# Patient Record
Sex: MALE | Race: WHITE | NOT HISPANIC OR LATINO | Employment: OTHER | URBAN - METROPOLITAN AREA
[De-identification: names, ages, dates, MRNs, and addresses within clinical notes are randomized per-mention and may not be internally consistent; named-entity substitution may affect disease eponyms.]

---

## 2017-05-11 ENCOUNTER — THERAPY VISIT (OUTPATIENT)
Dept: PHYSICAL THERAPY | Facility: CLINIC | Age: 77
End: 2017-05-11
Payer: COMMERCIAL

## 2017-05-11 DIAGNOSIS — M54.41 ACUTE RIGHT-SIDED LOW BACK PAIN WITH RIGHT-SIDED SCIATICA: Primary | ICD-10-CM

## 2017-05-11 PROBLEM — M54.42 ACUTE LEFT-SIDED LOW BACK PAIN WITH LEFT-SIDED SCIATICA: Status: ACTIVE | Noted: 2017-05-11

## 2017-05-11 PROCEDURE — 97112 NEUROMUSCULAR REEDUCATION: CPT | Mod: GP | Performed by: PHYSICAL THERAPIST

## 2017-05-11 PROCEDURE — 97161 PT EVAL LOW COMPLEX 20 MIN: CPT | Mod: GP | Performed by: PHYSICAL THERAPIST

## 2017-05-11 PROCEDURE — 97110 THERAPEUTIC EXERCISES: CPT | Mod: GP | Performed by: PHYSICAL THERAPIST

## 2017-05-11 NOTE — MR AVS SNAPSHOT
"              After Visit Summary   2017    Sb Mohan    MRN: [id number]           Patient Information     Date Of Birth          1940        Visit Information        Provider Department      2017 9:10 AM Lalita Briscoe PT [facility] Physical Therapy        Today's Diagnoses     Acute right-sided low back pain with right-sided sciatica    -  1       Follow-ups after your visit        Who to contact     If you have questions or need follow up information about today's clinic visit or your schedule please contact [facility] PHYSICAL [facility] directly at [phone].  Normal or non-critical lab and imaging results will be communicated to you by ZAPhart, letter or phone within 4 business days after the clinic has received the results. If you do not hear from us within 7 days, please contact the clinic through ZAPhart or phone. If you have a critical or abnormal lab result, we will notify you by phone as soon as possible.  Submit refill requests through TOS[employer] ([employer]) or call your pharmacy and they will forward the refill request to us. Please allow 3 business days for your refill to be completed.          Additional Information About Your Visit        MyChart Information     [employer] ([employer]) lets you send messages to your doctor, view your test results, renew your prescriptions, schedule appointments and more. To sign up, go to [email or web address] ([employer]) . Click on \"Log in\" on the left side of the screen, which will take you to the Welcome page. Then click on \"Sign up Now\" on the right side of the page.     You will be asked to enter the access code listed below, as well as some personal information. Please follow the directions to create your username and password.     Your access code is: [email or web address]  Expires: 2017  4:25 PM     Your access code will  in 90 days. If you need help or a new code, please call your [place] clinic or " [phone].        Care EveryWhere ID     This is your Care EveryWhere ID. This could be used by other organizations to access your [place] medical records  [id number]         Blood Pressure from Last 3 Encounters:   01/11/16 128/86    Weight from Last 3 Encounters:   01/11/16 103.8 kg (228 lb 12.8 oz)              We Performed the Following     ALEXANDER Inital Eval Report     Neuromuscular Re-Education     PT Eval, Low Complexity ([id number])     Therapeutic Exercises        Primary Care Provider Office Phone # Fax #    Guru Galarza [phone]       [address] [place] [address] [address]        Thank you!     Thank you for choosing [place] [facility] PHYSICAL THERAPY  for your care. Our goal is always to provide you with excellent care. Hearing back from our patients is one way we can continue to improve our services. Please take a few minutes to complete the written survey that you may receive in the mail after your visit with us. Thank you!             Your Updated Medication List - Protect others around you: Learn how to safely use, store and throw away your medicines at www.disposemymeds.org.          This list is accurate as of: 5/11/17  4:25 PM.  Always use your most recent med list.                   Brand Name Dispense Instructions for use    aspirin 81 MG tablet      Take 1 tablet by mouth daily.       CIALIS PO      Take 10 mg by mouth as needed for erectile dysfunction       fish oil-omega-3 fatty acids 1000 MG capsule      Take 1,200 g by mouth daily       FLONASE 50 MCG/ACT spray   Generic drug:  fluticasone      Spray 2 sprays into both nostrils daily as needed       LIPITOR 40 MG tablet   Generic drug:  atorvastatin      Take 40 mg by mouth daily.       NEXIUM PO      Take 20 mg by mouth daily       omeprazole 20 MG CR capsule    priLOSEC     Take 20 mg by mouth daily.       oxyCODONE-acetaminophen 5-325 MG per tablet    PERCOCET     60 tablet    Take 1-2 tablets by mouth every 4 hours as needed for pain (moderate to severe)       senna-docusate 8.6-50 MG per tablet    SENOKOT-S;PERICOLACE    30 tablet    Take 1-2 tablets by mouth 2 times daily Take while on oral narcotics to prevent or treat constipation.       ZANTAC PO      Take 300 mg by mouth daily.

## 2017-05-11 NOTE — PROGRESS NOTES
[place] [facility] Initial Evaluation    Subjective:    Patient is a 76 year old male presenting with rehab back hpi. The history is provided by the patient. No  was used.   Sb Mohan is a 76 year old male with a lumbar condition.  Condition occurred with:  Insidious onset.  Condition occurred: for unknown reasons.  This is a new condition  Patient reports that he noted onset of right buttock and hamstring pain about 4-5 weeks ago. He noted this at about he time he started to change his golf swing. He has been to the chiropractor 3 times and the first session helps, but the last 2 sessions have not changed the pain. Md order from 5-9-17..      Radiates to:  Gluteals right, gluteals left and thigh right (ache into the right posterior mid/distal thigh, R>>l glut).  Pain is described as cramping and aching and is intermittent and reported as 3/10.   Pain is the same all the time (pain based more on actiivity).  Symptoms are exacerbated by sitting, walking, bending and certain positions (walk paul 60' and then after about 15-20' he will have up to 6-7/10 pain, BB when sits and increase pain) and relieved by other (stretching, walking).  Since onset symptoms are unchanged.        General health as reported by patient is good.  Pertinent medical history includes:  Heart problems and implanted device.  Medical allergies: no.  Other surgeries include:  Heart surgery and orthopedic surgery (R foot fusion).  Current medications:  Cardiac medication and other (acid reflux, antibiotic).  Current occupation is retired.            Red flags:  None as reported by the patient.                        Objective:    System         Lumbar/SI Evaluation  ROM:    AROM Lumbar:   Flexion:          Fingetips to ankles and HS tightness  Ext:                    Sign loss and increase L glut   Side Bend:        Left:     Right:   Rotation:           Left:     Right:   Side Glide:        Left:  Mod loss  and no change    Right:  Mod loss and no change          Lumbar Myotomes:  normal            Lumbar DTR's:  normal      Cord Signs:  normal    Lumbar Dermtomes:  normal                Neural Tension/Mobility:  Lumbar:  Normal        Lumbar Palpation:    Tenderness present at Left:    Erector Spinae  Tenderness present at Right: Erector Spinae                                                       Suresh Lumbar Evaluation    Posture:  Sitting: fair  Standing: fair  Lordosis: Reduced  Lateral Shift: no  Correction of Posture: better      Test Movements:  FIS: During: no effect  After: no effect    Repeat FIS: During: no effect  After: no effect    EIS: During: increases  After: no worse    Repeat EIS: During: increases  After: worse    ANAND: During: no effect  After: no effect    Repeat ANAND: During: no effect  After: no effect    EIL: During: decreases  After: no worse    Repeat EIL: During: decreases  After: better  Mechanical Response: IncROM                                                 ROS    Assessment/Plan:      Patient is a 76 year old male with lumbar complaints.    Patient has the following significant findings with corresponding treatment plan.                Diagnosis 1:  Lumbar derangement  Pain -  manual therapy, self management, education, directional preference exercise and home program  Decreased ROM/flexibility - manual therapy, therapeutic exercise, therapeutic activity and home program  Impaired muscle performance - neuro re-education and home program  Decreased function - therapeutic activities and home program  Impaired posture - neuro re-education and home program    Therapy Evaluation Codes:   1) History comprised of:   Personal factors that impact the plan of care:      None.    Comorbidity factors that impact the plan of care are:      None.     Medications impacting care: None.  2) Examination of Body Systems comprised of:   Body structures and functions that impact the plan of care:       Lumbar spine.   Activity limitations that impact the plan of care are:      Lifting and Sitting.  3) Clinical presentation characteristics are:   Stable/Uncomplicated.  4) Decision-Making    Low complexity using standardized patient assessment instrument and/or measureable assessment of functional outcome.  Cumulative Therapy Evaluation is: Low complexity.    Previous and current functional limitations:  (See Goal Flow Sheet for this information)    Short term and Long term goals: (See Goal Flow Sheet for this information)     Communication ability:  Patient appears to be able to clearly communicate and understand verbal and written communication and follow directions correctly.  Treatment Explanation - The following has been discussed with the patient:   RX ordered/plan of care  Anticipated outcomes  Possible risks and side effects  This patient would benefit from PT intervention to resume normal activities.   Rehab potential is good.    Frequency:  1 X week, once daily  Duration:  for 6 weeks  Discharge Plan:  Achieve all LTG.  Independent in home treatment program.  Reach maximal therapeutic benefit.    Please refer to the daily flowsheet for treatment today, total treatment time and time spent performing 1:1 timed codes.

## 2018-01-29 ENCOUNTER — THERAPY VISIT (OUTPATIENT)
Dept: PHYSICAL THERAPY | Facility: CLINIC | Age: 78
End: 2018-01-29
Payer: MEDICARE

## 2018-01-29 DIAGNOSIS — M25.551 HIP PAIN, RIGHT: Primary | ICD-10-CM

## 2018-01-29 PROCEDURE — G8981 BODY POS CURRENT STATUS: HCPCS | Mod: GP | Performed by: PHYSICAL THERAPIST

## 2018-01-29 PROCEDURE — 97162 PT EVAL MOD COMPLEX 30 MIN: CPT | Mod: GP | Performed by: PHYSICAL THERAPIST

## 2018-01-29 PROCEDURE — 97110 THERAPEUTIC EXERCISES: CPT | Mod: GP | Performed by: PHYSICAL THERAPIST

## 2018-01-29 PROCEDURE — G8982 BODY POS GOAL STATUS: HCPCS | Mod: GP | Performed by: PHYSICAL THERAPIST

## 2018-01-29 NOTE — PROGRESS NOTES
[facility] [facility] Initial Evaluation    Subjective:  Patient is a 77 year old male presenting with rehab right hip hpi. The history is provided by the patient. No  was used.   Sb Mohan is a 77 year old male with a right hip condition.  Condition occurred with:  Insidious onset.  Condition occurred: for unknown reasons.  This is a new condition  Patient reports that he had insidious onset of right hip pain about 3-4 weeks ago. He has a chronic tightness in the right hamstrings and ITB as well, which has gotten worse the last few months. MD order from 1-26-18..    Patient reports pain:  Greater trochanter and lateral (groin pain for about a week, but has none now ).  Radiates to:  Gluteals and other (right lower  back).  Pain is described as aching and is intermittent and reported as 6/10.  Associated symptoms:  Loss of motion/stiffness. Pain is worse during the night.  Symptoms are exacerbated by lying on extremity and ascending stairs (3/10 pain to ascend stairs normally, lay on right side for 3-4' with 5-8/10 pain, walk paul 1 mile with 2/10 pain) and relieved by ice.  Since onset symptoms are gradually improving.  Special tests:  X-ray (negative per patient ).      General health as reported by patient is good.  Pertinent medical history includes:  Heart problems and implanted device.  Medical allergies: no.  Other surgeries include:  Orthopedic surgery, heart surgery and other (gall bladder removed, R ankle fusion).  Current medications:  Cardiac medication and other (Zantac, prilosac).  Current occupation is retired.        Barriers include:  None as reported by the patient.    Red flags:  None as reported by the patient.                        Objective:  System         Lumbar/SI Evaluation  ROM:    AROM Lumbar:   Flexion:        Fingertips to ankles and B HS tightess  Ext:                    Mod to sign loss and tightness   Side Bend:        Left:     Right:    Rotation:           Left:     Right:   Side Glide:        Left:     Right:                                                               Hip Evaluation  HIP AROM:  AROM:    Left Hip:     Normal    Right Hip:   Normal (mod tightness R posterior hip capsule)                  Hip PROM:  Hip PROM:  Left Hip:    Normal  Right Hip:  Normal (no pain with PROM R hip)                          Hip Strength:    Flexion:   Left: 5/5   -  Pain:  Right: 5-/5   -  Pain:                    Extension:  Left: 4+/5  -  Pain:Right: 4/5    -  Pain:    Abduction:  Left: 4+/5    -   Pain:Right: 4/5   -   Pain:    Internal Rotation:  Left: 5/5   -   Pain:Right: 5/5   -  Pain:  External Rotation:  Left: 5-/5   -  Pain:  Right: 4+/5   -  Pain:  Knee Flexion:  Left: 5/5   -  Pain:Right: 5/5   -  Pain:  Knee Extension:  Left: 5/5   -  Pain:Right: 5/5    -  Pain:        Hip Special Testing:       Right hip positive for the following special tests:  FabreRight hip negative for the following special tests:  Piriformis; Fadir/Labrum; SLR; Alma's; Hayden or Femoral Nerve    Hip Palpation:      Right hip tenderness present at:  Greater Trachanter and IT Band  Right hip tenderness not present at:  hip flexors or Piriformis               Suresh Lumbar Evaluation      Movement Loss:  Flexion (Flex): min  Extension (EXT): mod  Side Glide R (SG R): mod  Side [place] L (SG L): mod                                               ROS    Assessment/Plan:    Patient is a 77 year old male with right side hip complaints.    Patient has the following significant findings with corresponding treatment plan.                Diagnosis 1:  Hip pain  Pain -  US, manual therapy, self management, education, directional preference exercise and home program  Decreased ROM/flexibility - manual therapy, therapeutic exercise and home program  Decreased joint mobility - manual therapy, therapeutic exercise and home program  Decreased strength - therapeutic exercise,  therapeutic activities and home program  Impaired balance - neuro re-education, therapeutic activities and home program  Impaired muscle performance - neuro re-education and home program  Decreased function - therapeutic activities and home program    Therapy Evaluation Codes:   1) History comprised of:   Personal factors that impact the plan of care:      Time since onset of symptoms.    Comorbidity factors that impact the plan of care are:      None.     Medications impacting care: None.  2) Examination of Body Systems comprised of:   Body structures and functions that impact the plan of care:      Hip.   Activity limitations that impact the plan of care are:      Stairs, Walking and Laying down.  3) Clinical presentation characteristics are:   Evolving/Changing.  4) Decision-Making    Moderate complexity using standardized patient assessment instrument and/or measureable assessment of functional outcome.  Cumulative Therapy Evaluation is: Moderate complexity.    Previous and current functional limitations:  (See Goal Flow Sheet for this information)    Short term and Long term goals: (See Goal Flow Sheet for this information)     Communication ability:  Patient appears to be able to clearly communicate and understand verbal and written communication and follow directions correctly.  Treatment Explanation - The following has been discussed with the patient:   RX ordered/plan of care  Anticipated outcomes  Possible risks and side effects  This patient would benefit from PT intervention to resume normal activities.   Rehab potential is good.    Frequency:  2 X week, once daily  Duration:  for 2 weeks tapering to 1 X a week over 4 weeks  Discharge Plan:  Achieve all LTG.  Independent in home treatment program.  Reach maximal therapeutic benefit.    Please refer to the daily flowsheet for treatment today, total treatment time and time spent performing 1:1 timed codes.

## 2018-01-29 NOTE — LETTER
DEPARTMENT OF HEALTH AND HUMAN SERVICES  CENTERS FOR MEDICARE & MEDICAID SERVICES    PLAN/UPDATED PLAN OF PROGRESS FOR OUTPATIENT REHABILITATION    PATIENTS NAME:  Sb Mohan     : 1940    PROVID[facility] NUMBER:    [id number]    [facility]    PROVIDER NAME: [place] [facility]    MEDICAL RECORD NUMBER: [id number]     START OF CARE DATE:  SOC Date: 18   TYPE:  PT    PRIMARY/TREATMENT DIAGNOSIS: (Pertinent Medical Diagnosis)  Hip pain, right    VISITS FROM START OF CARE:  Rxs Used: 1     [facility] Initial Evaluation    Subjective:  Patient is a 77 year old male presenting with rehab right hip hpi. The history is provided by the patient. No  was used.   Sb Mohan is a 77 year old male with a right hip condition.  Condition occurred with:  Insidious onset.  Condition occurred: for unknown reasons.  This is a new condition  Patient reports that he had insidious onset of right hip pain about 3-4 weeks ago. He has a chronic tightness in the right hamstrings and ITB as well, which has gotten worse the last few months. MD order from 18.  Patient reports pain:  Greater trochanter and lateral (groin pain for about a week, but has none now ).  Radiates to:  Gluteals and other (right lower  back).  Pain is described as aching and is intermittent and reported as 6/10.  Associated symptoms:  Loss of motion/stiffness. Pain is worse during the night.  Symptoms are exacerbated by lying on extremity and ascending stairs (3/10 pain to ascend stairs normally, lay on right side for 3-4' with 5-8/10 pain, walk paul 1 mile with 2/10 pain) and relieved by ice.  Since onset symptoms are gradually improving.  Special tests:  X-ray (negative per patient ).      General health as reported by patient is good.  Pertinent medical history includes:  Heart problems and implanted device.  Medical allergies: no.  Other surgeries  include:  Orthopedic surgery, heart surgery and other (gall bladder removed, R ankle fusion).  Current medications:  Cardiac medication and other (Zantac, prilosac).  Current occupation is retired.    Barriers include:  None as reported by the patient.  Red flags:  None as reported by the patient.    Objective:      Lumbar/SI Evaluation  ROM:    AROM Lumbar:   Flexion:        Fingertips to ankles and B HS tightess  Ext:                    Mod to sign loss and tightness   PATIENTS NAME:  Sb Mohan   : 1940- Page 2    Side Bend:        Left:     Right:   Rotation:           Left:     Right:   Side Glide:        Left:     Right:   Hip Evaluation  HIP AROM:  AROM:    Left Hip:     Normal    Right Hip:   Normal (mod tightness R posterior hip capsule)  Hip PROM:  Hip PROM:  Left Hip:    Normal  Right Hip:  Normal (no pain with PROM R hip)  Hip Strength:    Flexion:   Left: 5/5   -  Pain:  Right: 5-/5   -  Pain:  Extension:  Left: 4+/5  -  Pain:Right: 4/5    -  Pain:    Abduction:  Left: 4+/5    -   Pain:Right: 4/5   -   Pain:  Internal Rotation:  Left: 5/5   -   Pain:Right: 5/5   -  Pain:  External Rotation:  Left: 5-/5   -  Pain:  Right: 4+/5   -  Pain:  Knee Flexion:  Left: 5/5   -  Pain:Right: 5/5   -  Pain:  Knee Extension:  Left: 5/5   -  Pain:Right: 5/5    -  Pain:  Hip Special Testing:    Right hip positive for the following special tests:  FabreRight hip negative for the following special tests:  Piriformis; Fadir/Labrum; SLR; Alma's; Hayden or Femoral Nerve    Hip Palpation:    Right hip tenderness present at:  Greater Trachanter and IT Band  Right hip tenderness not present at:  hip flexors or Piriformis  Suresh Lumbar Evaluation  Movement Loss:  Flexion (Flex): min  Extension (EXT): mod  Side Glide R (SG R): mod  Side [place] L (SG L): mod    Assessment/Plan:    Patient is a 77 year old male with right side hip complaints.    Patient has the following significant findings with corresponding  treatment plan.                Diagnosis 1:  Hip pain  Pain -  US, manual therapy, self management, education, directional preference exercise and home program  Decreased ROM/flexibility - manual therapy, therapeutic exercise and home program  Decreased joint mobility - manual therapy, therapeutic exercise and home program  Decreased strength - therapeutic exercise, therapeutic activities and home program  Impaired balance - neuro re-education, therapeutic activities and home program  Impaired muscle performance - neuro re-education and home program  Decreased function - therapeutic activities and home program    Therapy Evaluation Codes:   1) History comprised of:   Personal factors that impact the plan of care:      Time since onset of symptoms.    Comorbidity factors that impact the plan of care are:      None.      PATIENTS NAME:  Sb Mohan   : 1940- Page 3     Medications impacting care: None.  2) Examination of Body Systems comprised of:   Body structures and functions that impact the plan of care:      Hip.   Activity limitations that impact the plan of care are:      Stairs, Walking and Laying down.  3) Clinical presentation characteristics are:   Evolving/Changing.  4) Decision-Making    Moderate complexity using standardized patient assessment instrument and/or measureable assessment of functional outcome.  Cumulative Therapy Evaluation is: Moderate complexity.    Previous and current functional limitations:  (See Goal Flow Sheet for this information)    Short term and Long term goals: (See Goal Flow Sheet for this information)     Communication ability:  Patient appears to be able to clearly communicate and understand verbal and written communication and follow directions correctly.  Treatment Explanation - The following has been discussed with the patient:   RX ordered/plan of care  Anticipated outcomes  Possible risks and side effects  This patient would benefit from PT intervention to  "resume normal activities.   Rehab potential is good.    Frequency:  2 X week, once daily  Duration:  for 2 weeks tapering to 1 X a week over 4 weeks  Discharge Plan:  Achieve all LTG.  Independent in home treatment program.  Reach maximal therapeutic benefit.    Caregiver Signature/Credentials _____________________________ Date ________       Treating Provider: Lalita Briscoe PT   I have reviewed and certified the need for these services and plan of treatment while under my care.        PHYSICIAN'S SIGNATURE:   _________________________________________  Date___________   Guru MD Geovanni    Certification period:  Beginning of Cert date period: 01/29/18 to  End of Cert period date: 04/25/18     Functional Level Progress Report: Please see attached \"Goal Flow sheet for Functional level.\"    ____X____ Continue Services or       ________ DC Services                Service dates: From  SOC Date: 01/29/18 date to present                         "

## 2018-01-29 NOTE — MR AVS SNAPSHOT
"              After Visit Summary   1/29/2018    Sb Mohan    MRN: [id number]           Patient Information     Date Of Birth          1940        Visit Information        Provider Department      1/29/2018 8:30 AM Lalita Briscoe, PT [facility]        Today's Diagnoses     Hip pain, right    -  1       Follow-ups after your visit        Your next 10 appointments already scheduled     Feb 05, 2018  6:30 AM CST   ALEXANDER Spine with Lalita Briscoe PT   [facility] ([facility])    [address]  [facility] [id number] [phone]            Feb 08, 2018  7:50 AM CST   ALEXANDER Spine with Lalita Briscoe PT   [facility] ([facility])    [address]  [facility] [address] [phone]              Who to contact     If you have questions or need follow up information about today's clinic visit or your schedule please contact [facility]ER[facility] directly at [phone].  Normal or non-critical lab and imaging results will be communicated to you by MyChart, letter or phone within 4 business days after the clinic has received the results. If you do not hear from us within 7 days, please contact the clinic through TravelMusehart or phone. If you have a critical or abnormal lab result, we will notify you by phone as soon as possible.  Submit refill requests through RunRev or call your pharmacy and they will forward the refill request to us. Please allow 3 business days for your refill to be completed.          Additional Information About Your Visit        MyChart Information     RunRev lets you send messages to your doctor, view your test results, renew your prescriptions, schedule appointments and more. To sign up, go to [email or web address] . Click on \"Log in\" on the left " "side of the screen, which will take you to the Welcome page. Then click on \"Sign up Now\" on the right side of the page.     You will be asked to enter the access code listed below, as well as some personal information. Please follow the directions to create your username and password.     Your access code is: [email or web address]  Expires: 2018  4:13 PM     Your access code will  in 90 days. If you need help or a new code, please call your [facility] or [phone].        Care EveryWhere ID     This is your Care EveryWhere ID. This could be used by other organizations to access your [place] medical records  [id number]         Blood Pressure from Last 3 Encounters:   16 128/86    Weight from Last 3 Encounters:   16 103.8 kg (228 lb 12.8 oz)              We Performed the Following     ALEXANDER CERT REPORT     ALEXANDER Inital Eval Report     PT Eval, Moderate Complexity (62804)     Therapeutic Exercises        Primary Care Provider Office Phone # Fax #    Guru Galarza [phone]       [address] SOLO [address] [place] [address]        Equal Access to Services     FRANSICO ETIENNE AH: Hadii aad ku hadasho Sodelorisali, waaxda luqadaha, qaybta kaalmada adeegyada, harshil martinez. So [facility] [phone].    ATENCIÓN: Si habla español, tiene a iyer disposición servicios gratuitos de asistencia lingüística. [facility] [phone].    We comply with applicable federal civil rights laws and [place] laws. We do not discriminate on the basis of race, color, national origin, age, disability, sex, sexual orientation, or gender identity.            Thank you!     Thank you for choosing [facility]  for your care. Our goal is always to provide you with excellent care. Hearing back from our patients is one way we can continue to improve our services. Please take a few minutes to complete the written survey that you " may receive in the mail after your visit with us. Thank you!             Your Updated Medication List - Protect others around you: Learn how to safely use, store and throw away your medicines at www.disposemymeds.org.          This list is accurate as of 1/29/18 11:59 PM.  Always use your most recent med list.                   Brand Name Dispense Instructions for use Diagnosis    aspirin 81 MG tablet      Take 1 tablet by mouth daily.    Keratosis, actinic       CIALIS PO      Take 10 mg by mouth as needed for erectile dysfunction        fish oil-omega-3 fatty acids 1000 MG capsule      Take 1,200 g by mouth daily    Keratosis, actinic       FLONASE 50 MCG/ACT spray   Generic drug:  fluticasone      Spray 2 sprays into both nostrils daily as needed        LIPITOR 40 MG tablet   Generic drug:  atorvastatin      Take 40 mg by mouth daily.    Keratosis, actinic       NEXIUM PO      Take 20 mg by mouth daily        omeprazole 20 MG CR capsule    priLOSEC     Take 20 mg by mouth daily.    Keratosis, actinic       oxyCODONE-acetaminophen 5-325 MG per tablet    PERCOCET    60 tablet    Take 1-2 tablets by mouth every 4 hours as needed for pain (moderate to severe)    S/P foot surgery, right       senna-docusate 8.6-50 MG per tablet    SENOKOT-S;PERICOLACE    30 tablet    Take 1-2 tablets by mouth 2 times daily Take while on oral narcotics to prevent or treat constipation.    S/P foot surgery, right       ZANTAC PO      Take 300 mg by mouth daily.    Keratosis, actinic

## 2018-02-01 ENCOUNTER — THERAPY VISIT (OUTPATIENT)
Dept: PHYSICAL THERAPY | Facility: CLINIC | Age: 78
End: 2018-02-01
Payer: MEDICARE

## 2018-02-01 DIAGNOSIS — M25.551 HIP PAIN, RIGHT: Primary | ICD-10-CM

## 2018-02-01 PROCEDURE — 97140 MANUAL THERAPY 1/> REGIONS: CPT | Mod: GP | Performed by: PHYSICAL THERAPIST

## 2018-02-01 PROCEDURE — 97110 THERAPEUTIC EXERCISES: CPT | Mod: GP | Performed by: PHYSICAL THERAPIST

## 2018-02-05 ENCOUNTER — THERAPY VISIT (OUTPATIENT)
Dept: PHYSICAL THERAPY | Facility: CLINIC | Age: 78
End: 2018-02-05
Payer: MEDICARE

## 2018-02-05 DIAGNOSIS — M25.551 HIP PAIN, RIGHT: ICD-10-CM

## 2018-02-05 PROCEDURE — 97110 THERAPEUTIC EXERCISES: CPT | Mod: GP | Performed by: PHYSICAL THERAPIST

## 2018-02-05 PROCEDURE — 97140 MANUAL THERAPY 1/> REGIONS: CPT | Mod: GP | Performed by: PHYSICAL THERAPIST

## 2018-02-05 NOTE — MR AVS SNAPSHOT
"              After Visit Summary   2/5/2018    Sb Mohan    MRN: [id number]           Patient Information     Date Of Birth          1940        Visit Information        Provider Department      2/5/2018 6:30 AM [facility]ita [facility]e PT [facility]        Today's Diagnoses     Hip pain, right           Follow-ups after your visit        Your next 10 appointments already scheduled     Feb 08, 2018  7:50 AM CST   ALEXANDER Spine with Lalita Briscoe PT   [facility] ([facility])    [address]  [facility] [id number] [phone]              Who to contact     If you have questions or need follow up information about today's clinic visit or your schedule please contact [facility] directly at [phone].  Normal or non-critical lab and imaging results will be communicated to you by MyChart, letter or phone within 4 business days after the clinic has received the results. If you do not hear from us within 7 days, please contact the clinic through Nanushkahart or phone. If you have a critical or abnormal lab result, we will notify you by phone as soon as possible.  Submit refill requests through [employer] [place] or call your pharmacy and they will forward the refill request to us. Please allow 3 business days for your refill to be completed.          Additional Information About Your Visit        MyChart Information     [employer] [place] lets you send messages to your doctor, view your test results, renew your prescriptions, schedule appointments and more. To sign up, go to [email or web address] . Click on \"Log in\" on the left side of the screen, which will take you to the Welcome page. Then click on \"Sign up Now\" on the right side of the page.     You will be asked to enter the access code listed below, as well as some personal information. Please follow " the directions to create your username and password.     Your access code is: [email or web address]  Expires: 2018  4:13 PM     Your access code will  in 90 days. If you need help or a new code, please call your [place] clinic or [phone].        Care EveryWhere ID     This is your Care EveryWhere ID. This could be used by other organizations to access your [place] medical records  [id number]         Blood Pressure from Last 3 Encounters:   16 128/86    Weight from Last 3 Encounters:   16 103.8 kg (228 lb 12.8 oz)              We Performed the Following     Manual Ther Tech, 1+Regions, EA 15 min     Therapeutic Exercises        Primary Care Provider Office Phone # Fax #    Guru Galarza [phone]       [address] SOLO [address] [place] [address]        Equal Access to Services     HALIMA ETIENNE : Hadii aad ku hadasho Soomaali, waaxda luqadaha, qaybta kaalmada adeegyada, waxay idiin hayaan howie dumont . So [facility] [phone].    ATENCIÓN: Si habla español, tiene a iyer disposición servicios gratuitos de asistencia lingüística. Og al [phone].    We comply with applicable federal civil rights laws and [place] laws. We do not discriminate on the basis of race, color, national origin, age, disability, sex, sexual orientation, or gender identity.            Thank you!     Thank you for choosing [facility]  for your care. Our goal is always to provide you with excellent care. Hearing back from our patients is one way we can continue to improve our services. Please take a few minutes to complete the written survey that you may receive in the mail after your visit with us. Thank you!             Your Updated Medication List - Protect others around you: Learn how to safely use, store and throw away your medicines at www.disposemymeds.org.          This list is accurate as of 18  7:37 AM.   Always use your most recent med list.                   Brand Name Dispense Instructions for use Diagnosis    aspirin 81 MG tablet      Take 1 tablet by mouth daily.    Keratosis, actinic       CIALIS PO      Take 10 mg by mouth as needed for erectile dysfunction        fish oil-omega-3 fatty acids 1000 MG capsule      Take 1,200 g by mouth daily    Keratosis, actinic       FLONASE 50 MCG/ACT spray   Generic drug:  fluticasone      Spray 2 sprays into both nostrils daily as needed        LIPITOR 40 MG tablet   Generic drug:  atorvastatin      Take 40 mg by mouth daily.    Keratosis, actinic       NEXIUM PO      Take 20 mg by mouth daily        omeprazole 20 MG CR capsule    priLOSEC     Take 20 mg by mouth daily.    Keratosis, actinic       oxyCODONE-acetaminophen 5-325 MG per tablet    PERCOCET    60 tablet    Take 1-2 tablets by mouth every 4 hours as needed for pain (moderate to severe)    S/P foot surgery, right       senna-docusate 8.6-50 MG per tablet    SENOKOT-S;PERICOLACE    30 tablet    Take 1-2 tablets by mouth 2 times daily Take while on oral narcotics to prevent or treat constipation.    S/P foot surgery, right       ZANTAC PO      Take 300 mg by mouth daily.    Keratosis, actinic

## 2018-02-05 NOTE — PROGRESS NOTES
Subjective:  HPI                    Objective:  System    Physical Exam    General     ROS    Assessment/Plan:    SUBJECTIVE  Subjective changes as noted by pt:  Patent reports that he slept well last night, but had some pain the night before. He does feel the half kneel stretch helps and he is doing it about 3 times a day.Laying on the right side is still what bothers him the most. He had no pain with bowling. He has no pain crossing his legs either way. He feels better if walks a coupe of miles every day on the treadmill.      Current pain level: 1/10     Changes in function:  Yes (See Goal flowsheet attached for changes in current functional level)     Adverse reaction to treatment or activity:  None    OBJECTIVE  Changes in objective findings:  Supine R hip PROM: no pain with movement. Less tenderness, but still moderate tightness R ITB.        ASSESSMENT  Sb continues to require intervention to meet STG and LTG's: PT  Patient's symptoms are resolving.  Patient is progressing as expected.  Response to therapy has shown an improvement in  pain level and function  Progress made towards STG/LTG?  Yes (See Goal flowsheet attached for updates on achievement of STG and LTG)    PLAN  Continue current treatment plan until patient demonstrates readiness to progress to higher level exercises.    PTA/ATC plan:  N/A    Please refer to the daily flowsheet for treatment today, total treatment time and time spent performing 1:1 timed codes.

## 2018-02-08 ENCOUNTER — THERAPY VISIT (OUTPATIENT)
Dept: PHYSICAL THERAPY | Facility: CLINIC | Age: 78
End: 2018-02-08
Payer: MEDICARE

## 2018-02-08 DIAGNOSIS — M25.551 HIP PAIN, RIGHT: ICD-10-CM

## 2018-02-08 PROCEDURE — 97110 THERAPEUTIC EXERCISES: CPT | Mod: GP | Performed by: PHYSICAL THERAPIST

## 2018-02-08 PROCEDURE — 97140 MANUAL THERAPY 1/> REGIONS: CPT | Mod: GP | Performed by: PHYSICAL THERAPIST

## 2018-02-26 ENCOUNTER — THERAPY VISIT (OUTPATIENT)
Dept: PHYSICAL THERAPY | Facility: CLINIC | Age: 78
End: 2018-02-26
Payer: MEDICARE

## 2018-02-26 DIAGNOSIS — M25.551 HIP PAIN, RIGHT: ICD-10-CM

## 2018-02-26 PROCEDURE — 97110 THERAPEUTIC EXERCISES: CPT | Mod: GP | Performed by: PHYSICAL THERAPIST

## 2018-02-26 PROCEDURE — 97530 THERAPEUTIC ACTIVITIES: CPT | Mod: GP | Performed by: PHYSICAL THERAPIST

## 2018-02-26 PROCEDURE — G8983 BODY POS D/C STATUS: HCPCS | Mod: GP | Performed by: PHYSICAL THERAPIST

## 2018-02-26 PROCEDURE — G8981 BODY POS CURRENT STATUS: HCPCS | Mod: GP | Performed by: PHYSICAL THERAPIST

## 2018-02-26 PROCEDURE — G8982 BODY POS GOAL STATUS: HCPCS | Mod: GP | Performed by: PHYSICAL THERAPIST

## 2018-02-26 NOTE — PROGRESS NOTES
Subjective:  HPI                    Objective:  System    Physical Exam    General     ROS    Assessment/Plan:    DISCHARGE  REPORT    Progress reporting period is from 1-29-18 to 2-26-18.       SUBJECTIVE  Subjective changes noted by patient:  Patient reports that he has had significant improvement in his hip pain since starting therapy. If he does get some pain, he can get rid of it by doing the hip flexor stretch. He has had some medial right knee pain, especially when trying to get up from the floor.         Current Pain level: 0/10.     Previous pain level was  6/10  .   Changes in function:  Yes (See Goal flowsheet attached for changes in current functional level)  Adverse reaction to treatment or activity: None    OBJECTIVE  Changes noted in objective findings:  No pain with passive or active R hip ROM. Gait WNL. R hip strength: extension and abduction+/5 with better endurance. He is not able to do a 6 inch step down without dropping his pelvis and going into knee valgus. Significant decrease in tenderness to R greater trochanteric bursa region and R ITB.        ASSESSMENT/PLAN  Updated problem list and treatment plan: Diagnosis 1:  Hip pain  Pain -  manual therapy, self management, education, directional preference exercise and home program  Decreased ROM/flexibility - manual therapy, therapeutic exercise, therapeutic activity and home program  Decreased strength - therapeutic exercise, therapeutic activities and home program  Impaired muscle performance - neuro re-education and home program  Decreased function - therapeutic activities and home program  STG/LTGs have been met or progress has been made towards goals:  Yes (See Goal flow sheet completed today.)  Assessment of Progress: The patient's condition is improving.  The patient's condition has potential to improve.  The patient has met all of their long term goals.  Self Management Plans:  Patient has been instructed in a home treatment  program.  Patient  has been instructed in self management of symptoms.    Sb continues to require the following intervention to meet STG and LTG's:  PT intervention is no longer required to meet STG/LTG.    Recommendations:  This patient is ready to be discharged from therapy and continue their home treatment program.    Please refer to the daily flowsheet for treatment today, total treatment time and time spent performing 1:1 timed codes.

## 2018-02-26 NOTE — LETTER
[facility]  [address] [facility] [address]  [place] [address] [phone]    2018    Re: Sb Mohan   :   1940  MRN:  [id number]   REFERRING PHYSICIAN:   Tanner Jerome  DISCHARGE  REPORT    Progress reporting period is from 18 to 18.       SUBJECTIVE  Subjective changes noted by patient:  Patient reports that he has had significant improvement in his hip pain since starting therapy. If he does get some pain, he can get rid of it by doing the hip flexor stretch. He has had some medial right knee pain, especially when trying to get up from the floor.         Current Pain level: 0/10.     Previous pain level was  6/10  .   Changes in function:  Yes (See Goal flowsheet attached for changes in current functional level)  Adverse reaction to treatment or activity: None    OBJECTIVE  Changes noted in objective findings:  No pain with passive or active R hip ROM. Gait WNL. R hip strength: extension and abduction+/5 with better endurance. He is not able to do a 6 inch step down without dropping his pelvis and going into knee valgus. Significant decrease in tenderness to R greater trochanteric bursa region and R ITB.        ASSESSMENT/PLAN  Updated problem list and treatment plan: Diagnosis 1:  Hip pain  Pain -  manual therapy, self management, education, directional preference exercise and home program  Decreased ROM/flexibility - manual therapy, therapeutic exercise, therapeutic activity and home program  Decreased strength - therapeutic exercise, therapeutic activities and home program  Impaired muscle performance - neuro re-education and home program  Decreased function - therapeutic activities and home program  STG/LTGs have been met or progress has been made towards goals:  Yes (See Goal flow sheet completed today.)  Assessment of Progress: The patient's condition is improving.  The patient's condition has potential to  improve.  The patient has met all of their long term goals.  Self Management Plans:  Patient has been instructed in a home treatment program.  Patient  has been instructed in self management of symptoms.    Sb continues to require the following intervention to meet STG and LTG's:  PT intervention is no longer required to meet STG/LTG.    Recommendations:  This patient is ready to be discharged from therapy and continue their home treatment program.    Thank you for your referral.    INQUIRIES  Therapist: Lalita Briscoe, PT   [facility]  [address] [place] [address]. [address]  [facility] [address]  Phone: [phone]  Fax: [phone]

## 2018-02-26 NOTE — MR AVS SNAPSHOT
"              After Visit Summary   2018    Sb Mohan    MRN: [id number]           Patient Information     Date Of Birth          1940        Visit Information        Provider Department      2018 8:30 AM Lalita [facility]e PT [facility]        Today's Diagnoses     Hip pain, right           Follow-ups after your visit        Who to contact     If you have questions or need follow up information about today's clinic visit or your schedule please contact [facility] directly at [phone].  Normal or non-critical lab and imaging results will be communicated to you by Bluechillihart, letter or phone within 4 business days after the clinic has received the results. If you do not hear from us within 7 days, please contact the clinic through ProjectSpeakert or phone. If you have a critical or abnormal lab result, we will notify you by phone as soon as possible.  Submit refill requests through [place] [employer] or call your pharmacy and they will forward the refill request to us. Please allow 3 business days for your refill to be completed.          Additional Information About Your Visit        MyChart Information     [employer] lets you send messages to your doctor, view your test results, renew your prescriptions, schedule appointments and more. To sign up, go to [email or web address] . Click on \"Log in\" on the left side of the screen, which will take you to the Welcome page. Then click on \"Sign up Now\" on the right side of the page.     You will be asked to enter the access code listed below, as well as some personal information. Please follow the directions to create your username and password.     Your access code is: [email or web address]  Expires: 2018  4:13 PM     Your access code will  in 90 days. If you need help or a new code, please call your [place] clinic or [phone].        Care EveryWhere ID     This " is your Care EveryWhere ID. This could be used by other organizations to access your [place] medical records  [id number]         Blood Pressure from Last 3 Encounters:   01/11/16 128/86    Weight from Last 3 Encounters:   01/11/16 103.8 kg (228 lb 12.8 oz)              We Performed the Following     ALEXANDER Progress Notes Report     Therapeutic Activities     Therapeutic Exercises        Primary Care Provider Office Phone # Fax #    Guru Galarza [phone]       [address] [facility] [address]        Equal Access to Services     : Hadii aad ku hadasho Soomaali, waaxda luqadaha, qaybta kaalmada adeegyada, waxay cristhianin hayaan ademay dumont . So [facility] [phone].    ATENCIÓN: Si habla español, tiene a iyer disposición servicios gratuitos de asistencia lingüística. [facility] [phone].    We comply with applicable federal civil rights laws and [place] laws. We do not discriminate on the basis of race, color, national origin, age, disability, sex, sexual orientation, or gender identity.            Thank you!     Thank you for choosing [facility] PHYSICAL THERAP[facility]  for your care. Our goal is always to provide you with excellent care. Hearing back from our patients is one way we can continue to improve our services. Please take a few minutes to complete the written survey that you may receive in the mail after your visit with us. Thank you!             Your Updated Medication List - Protect others around you: Learn how to safely use, store and throw away your medicines at www.disposemymeds.org.          This list is accurate as of 2/26/18  2:14 PM.  Always use your most recent med list.                   Brand Name Dispense Instructions for use Diagnosis    aspirin 81 MG tablet      Take 1 tablet by mouth daily.    Keratosis, actinic       CIALIS PO      Take 10 mg by mouth as needed for erectile dysfunction         fish oil-omega-3 fatty acids 1000 MG capsule      Take 1,200 g by mouth daily    Keratosis, actinic       FLONASE 50 MCG/ACT spray   Generic drug:  fluticasone      Spray 2 sprays into both nostrils daily as needed        LIPITOR 40 MG tablet   Generic drug:  atorvastatin      Take 40 mg by mouth daily.    Keratosis, actinic       NEXIUM PO      Take 20 mg by mouth daily        omeprazole 20 MG CR capsule    priLOSEC     Take 20 mg by mouth daily.    Keratosis, actinic       oxyCODONE-acetaminophen 5-325 MG per tablet    PERCOCET    60 tablet    Take 1-2 tablets by mouth every 4 hours as needed for pain (moderate to severe)    S/P foot surgery, right       senna-docusate 8.6-50 MG per tablet    SENOKOT-S;PERICOLACE    30 tablet    Take 1-2 tablets by mouth 2 times daily Take while on oral narcotics to prevent or treat constipation.    S/P foot surgery, right       ZANTAC PO      Take 300 mg by mouth daily.    Keratosis, actinic

## 2019-01-15 ENCOUNTER — THERAPY VISIT (OUTPATIENT)
Dept: PHYSICAL THERAPY | Facility: CLINIC | Age: 79
End: 2019-01-15
Payer: MEDICARE

## 2019-01-15 DIAGNOSIS — M54.42 ACUTE BILATERAL LOW BACK PAIN WITH LEFT-SIDED SCIATICA: ICD-10-CM

## 2019-01-15 PROCEDURE — 97161 PT EVAL LOW COMPLEX 20 MIN: CPT | Mod: GP | Performed by: PHYSICAL THERAPIST

## 2019-01-15 PROCEDURE — 97112 NEUROMUSCULAR REEDUCATION: CPT | Mod: GP | Performed by: PHYSICAL THERAPIST

## 2019-01-15 PROCEDURE — 97110 THERAPEUTIC EXERCISES: CPT | Mod: GP | Performed by: PHYSICAL THERAPIST

## 2019-01-15 NOTE — PROGRESS NOTES
[facility] Initial Evaluation  Subjective:  The history is provided by the patient. No  was used.   Sb Mohan is a 78 year old male with a lumbar condition.  Condition occurred with:  A fall/slip.  Condition occurred: at home.  This is a new condition  Patient reports that about 2 weeks ago he slipped on the ice and caught himself and didn't fall, but felt something in his lower back. The pain became worse the following day and has persisted. MD order from 1-11-19. .    Patient reports pain:  Lumbar spine left, lumbar spine right and central lumbar spine (L>>>R).  Radiates to:  Foot left, lower leg left and gluteals left (lateral L lower leg and into the L foot).  Pain is described as aching and is intermittent and reported as 4/10.  Associated symptoms:  Loss of motion/stiffness. Pain is the same all the time.  Symptoms are exacerbated by sitting, lifting, bending and other (sit paul 1 hour with 4-5/10, difficult to cross L leg over the R in sitting ) and relieved by other, ice and NSAID's (doing some exercises).  Since onset symptoms are gradually improving.    Previous treatment includes chiropractic (has had 3 chiropractor visits wtih 10-15% improvement).    General health as reported by patient is good.  Pertinent medical history includes:  Heart problems and implanted device.  Medical allergies: no.  Other surgeries include:  Heart surgery and orthopedic surgery (ANKLE/TOE SURGERY).  Current medications:  Cardiac medication, anti-inflammatory and other (antacids).  Current occupation is Retired teacher.        Barriers include:  None as reported by the patient.    Red flags:  None as reported by the patient.                        Objective:  System         Lumbar/SI Evaluation  ROM:    AROM Lumbar:   Flexion:          Fingertips to just above ankles and tight HS  Ext:                    Mod to sign loss and no change   Side Bend:        Left:     Right:    Rotation:           Left:     Right:   Side Glide:        Left:  Mod loss and ache L lower back    Right:  Min loss and no pain          Lumbar Myotomes:  normal            Lumbar DTR's:  normal      Cord Signs:  normal    Lumbar Dermtomes:  normal                Neural Tension/Mobility:  Lumbar:  Not assessed        Lumbar Palpation:    Tenderness present at Left:    Quadratus Lumborum and Erector Spinae  Tenderness present at Right: Erector Spinae                                                       Suresh Lumbar Evaluation    Posture:  Sitting: fair  Standing: fair  Lordosis: Reduced  Lateral Shift: no  Correction of Posture: better      Test Movements:  FIS: During: increases  After: no worse    Repeat FIS: During: increases  After: worse and peripheralizing    EIS: During: increases  After: no worse    Repeat EIS: During: decreases  After: better and centralizing  Mechanical Response: IncROM                                                     ROS    Assessment/Plan:    Patient is a 78 year old male with lumbar complaints.    Patient has the following significant findings with corresponding treatment plan.                Diagnosis 1:  Lumbar derangement  Pain -  US, manual therapy, self management, education, directional preference exercise and home program  Decreased ROM/flexibility - manual therapy, therapeutic exercise and home program  Impaired muscle performance - neuro re-education and home program  Decreased function - therapeutic activities and home program  Impaired posture - neuro re-education and home program    Therapy Evaluation Codes:   1) History comprised of:   Personal factors that impact the plan of care:      None.    Comorbidity factors that impact the plan of care are:      None.     Medications impacting care: None.  2) Examination of Body Systems comprised of:   Body structures and functions that impact the plan of care:      Lumbar spine.   Activity limitations that impact the plan  of care are:      Bathing, Bending and Sitting.  3) Clinical presentation characteristics are:   Stable/Uncomplicated.  4) Decision-Making    Low complexity using standardized patient assessment instrument and/or measureable assessment of functional outcome.  Cumulative Therapy Evaluation is: Low complexity.    Previous and current functional limitations:  (See Goal Flow Sheet for this information)    Short term and Long term goals: (See Goal Flow Sheet for this information)     Communication ability:  Patient appears to be able to clearly communicate and understand verbal and written communication and follow directions correctly.  Treatment Explanation - The following has been discussed with the patient:   RX ordered/plan of care  Anticipated outcomes  Possible risks and side effects  This patient would benefit from PT intervention to resume normal activities.   Rehab potential is good.    Frequency:  2 X week, once daily  Duration:  for 2 weeks tapering to 1 X a week over 2 weeks  Discharge Plan:  Achieve all LTG.  Independent in home treatment program.  Reach maximal therapeutic benefit.    Please refer to the daily flowsheet for treatment today, total treatment time and time spent performing 1:1 timed codes.

## 2019-01-15 NOTE — LETTER
DEPARTMENT OF HEALTH AND HUMAN SERVICES  CENTERS FOR MEDICARE & MEDICAID SERVICES    PLAN/UPDATED PLAN OF PROGRESS FOR OUTPATIENT REHABILITATION    PATIENTS NAME:  Sb Mohan   : 1940  PROVID[facility] NUMBER:    [id number]  [id number]   PROVIDER NAME: [facility]  MEDICAL RECORD NUMBER: [id number]   START OF CARE DATE:  SOC Date: 01/15/19   TYPE:  PT  PRIMARY/TREATMENT DIAGNOSIS: (Pertinent Medical Diagnosis)  Acute bilateral low back pain with left-sided sciatica  VISITS FROM START OF CARE:  Rxs Used: 1     [facility] Initial Evaluation  Subjective:  The history is provided by the patient. No  was used.   Sb Mohan is a 78 year old male with a lumbar condition.  Condition occurred with:  A fall/slip.  Condition occurred: at home.  This is a new condition  Patient reports that about 2 weeks ago he slipped on the ice and caught himself and didn't fall, but felt something in his lower back. The pain became worse the following day and has persisted. MD order from 19. .    Patient reports pain:  Lumbar spine left, lumbar spine right and central lumbar spine (L>>>R).  Radiates to:  Foot left, lower leg left and gluteals left (lateral L lower leg and into the L foot).  Pain is described as aching and is intermittent and reported as 4/10.  Associated symptoms:  Loss of motion/stiffness. Pain is the same all the time.  Symptoms are exacerbated by sitting, lifting, bending and other (sit paul 1 hour with 4-5/10, difficult to cross L leg over the R in sitting ) and relieved by other, ice and NSAID's (doing some exercises).  Since onset symptoms are gradually improving.    Previous treatment includes chiropractic (has had 3 chiropractor visits wtih 10-15% improvement).    General health as reported by patient is good.  Pertinent medical history includes:  Heart problems and implanted device.  Medical allergies: no.  Other  surgeries include:  Heart surgery and orthopedic surgery (ANKLE/TOE SURGERY).  Current medications:  Cardiac medication, anti-inflammatory and other (antacids).  Current occupation is Retired teacher.      Barriers include:  None as reported by the patient.  Red flags:  None as reported by the patient.  Objective:  System    Lumbar/SI Evaluation  ROM:    AROM Lumbar:   Flexion:          Fingertips to just above ankles and tight HS  Ext:                    Mod to sign loss and no change   Side Bend:        Left:     Right:   Rotation:           Left:     Right:   Side Glide:        Left:  Mod loss and ache L lower back    Right:  Min loss and no pain       Lumbar Myotomes:  normal  Lumbar DTR's:  normal  Cord Signs:  normal  Lumbar Dermtomes:  normal  Neural Tension/Mobility:  Lumbar:  Not assessed    Lumbar Palpation:    Tenderness present at Left:    Quadratus Lumborum and Erector Spinae  Tenderness present at Right: Erector Spinae    Suresh Lumbar Evaluation  Posture:  Sitting: fair  Standing: fair  Lordosis: Reduced  Lateral Shift: no  Correction of Posture: better  Test Movements:  FIS: During: increases  After: no worse    Repeat FIS: During: increases  After: worse and peripheralizing    EIS: During: increases  After: no worse    Repeat EIS: During: decreases  After: better and centralizing  Mechanical Response: IncROM  Assessment/Plan:    Patient is a 78 year old male with lumbar complaints.    Patient has the following significant findings with corresponding treatment plan.                Diagnosis 1:  Lumbar derangement  Pain -  US, manual therapy, self management, education, directional preference exercise and home program  Decreased ROM/flexibility - manual therapy, therapeutic exercise and home program  Impaired muscle performance - neuro re-education and home program  Decreased function - therapeutic activities and home program  Impaired posture - neuro re-education and home program    Therapy  Evaluation Codes:   1) History comprised of:   Personal factors that impact the plan of care:      None.    Comorbidity factors that impact the plan of care are:      None.     Medications impacting care: None.  2) Examination of Body Systems comprised of:   Body structures and functions that impact the plan of care:      Lumbar spine.   Activity limitations that impact the plan of care are:      Bathing, Bending and Sitting.  3) Clinical presentation characteristics are:   Stable/Uncomplicated.  4) Decision-Making    Low complexity using standardized patient assessment instrument and/or measureable assessment of functional outcome.  Cumulative Therapy Evaluation is: Low complexity.    Previous and current functional limitations:  (See Goal Flow Sheet for this information)    Short term and Long term goals: (See Goal Flow Sheet for this information)     Communication ability:  Patient appears to be able to clearly communicate and understand verbal and written communication and follow directions correctly.  Treatment Explanation - The following has been discussed with the patient:   RX ordered/plan of care  Anticipated outcomes  Possible risks and side effects  This patient would benefit from PT intervention to resume normal activities.   Rehab potential is good.    Frequency:  2 X week, once daily  Duration:  for 2 weeks tapering to 1 X a week over 2 weeks  Discharge Plan:  Achieve all LTG.  Independent in home treatment program.  Reach maximal therapeutic benefit.    Please refer to the daily flowsheet for treatment today, total treatment time and time spent performing 1:1 timed codes.     Caregiver Signature/Credentials ______________________________________________ Date ________       Lalita Briscoe PT   I have reviewed and certified the need for these services and plan of treatment while under my care.        PHYSICIAN'S SIGNATURE:   ______________________________________________  Date___________     Jesús  "Vishal Ivey MD    Certification period:  Beginning of Cert date period: 01/15/19 to  End of Cert period date: 03/26/19   Functional Level Progress Report: Please see attached \"Goal Flow sheet for Functional level.\"  ____X____ Continue Services or       ________ DC Services              Service dates: From  SOC Date: 01/15/19 date to present                       "

## 2019-01-17 ENCOUNTER — THERAPY VISIT (OUTPATIENT)
Dept: PHYSICAL THERAPY | Facility: CLINIC | Age: 79
End: 2019-01-17
Payer: MEDICARE

## 2019-01-17 DIAGNOSIS — M54.42 ACUTE BILATERAL LOW BACK PAIN WITH LEFT-SIDED SCIATICA: ICD-10-CM

## 2019-01-17 PROCEDURE — 97110 THERAPEUTIC EXERCISES: CPT | Mod: GP | Performed by: PHYSICAL THERAPIST

## 2019-01-17 PROCEDURE — 97140 MANUAL THERAPY 1/> REGIONS: CPT | Mod: GP | Performed by: PHYSICAL THERAPIST

## 2019-01-23 ENCOUNTER — THERAPY VISIT (OUTPATIENT)
Dept: PHYSICAL THERAPY | Facility: CLINIC | Age: 79
End: 2019-01-23
Payer: MEDICARE

## 2019-01-23 DIAGNOSIS — M54.42 ACUTE BILATERAL LOW BACK PAIN WITH LEFT-SIDED SCIATICA: ICD-10-CM

## 2019-01-23 PROCEDURE — 97110 THERAPEUTIC EXERCISES: CPT | Mod: GP | Performed by: PHYSICAL THERAPIST

## 2019-01-23 PROCEDURE — 97140 MANUAL THERAPY 1/> REGIONS: CPT | Mod: GP | Performed by: PHYSICAL THERAPIST

## 2019-01-23 NOTE — PROGRESS NOTES
Subjective:  HPI                    Objective:  System    Physical Exam    General     ROS    Assessment/Plan:    SUBJECTIVE  Subjective changes as noted by pt:  Patient reports that he has improved by 70% since starting therapy. He bowled 3 lines on Monday of this week and had no pain with the bowling, but had some soreness following.His pain is in the hip/buttock region-no leg pain.             Current Pain level: 0/10   Changes in function:  Yes (See Goal flowsheet attached for changes in current functional level)     Adverse reaction to treatment or activity:  None    OBJECTIVE  Changes in objective findings:  Standing LROM: FB with fingertips to ankles and no pain and no pain with RFIS, BB mod loss and no pain, and B side glides min loss and no pain. Negative dural signs and symptoms.               ASSESSMENT  Sb continues to require intervention to meet STG and LTG's: PT  Patient's symptoms are resolving.  Patient is progressing as expected.  Response to therapy has shown an improvement in  radicular symptoms, ROM  and function  Progress made towards STG/LTG?  Yes (See Goal flowsheet attached for updates on achievement of STG and LTG)    PLAN  Current treatment program is being advanced to more complex exercises.    PTA/ATC plan:  N/A    Please refer to the daily flowsheet for treatment today, total treatment time and time spent performing 1:1 timed codes.

## 2019-01-23 NOTE — PROGRESS NOTES
"Subjective:  HPI                    Objective:  System    Physical Exam    General     ROS    Assessment/Plan:      Progress reporting period is from *** to   SUBJECTIVE  Subjective changes noted by patient:  .     Previous pain level was  {PAIN LEVEL (SCALE OF 10):940024}  .   Changes in function:    Adverse reaction to treatment or activity:     OBJECTIVE  Changes noted in objective findings:         ASSESSMENT/PLAN  Updated problem list and treatment plan: {DIAGNOSIS/PLAN REHAB:174052}  STG/LTGs have been met or progress has been made towards goals:  {Goals met/progress made:706199::\"Yes (See Goal flow sheet completed today.)\"}  Assessment of Progress: {Assessment of progress:696758}  Self Management Plans:  {Self Management Plans:973820}  {Appropriateness of Rx:470135}  Sb continues to require the following intervention to meet STG and LTG's:  {INTERVENTION REHAB:721637}    Recommendations:  {RECOMMENDATIONS REHAB:493748}    Please refer to the daily flowsheet for treatment today, total treatment time and time spent performing 1:1 timed codes.          "

## 2019-01-29 ENCOUNTER — THERAPY VISIT (OUTPATIENT)
Dept: PHYSICAL THERAPY | Facility: CLINIC | Age: 79
End: 2019-01-29
Payer: MEDICARE

## 2019-01-29 DIAGNOSIS — M54.42 ACUTE BILATERAL LOW BACK PAIN WITH LEFT-SIDED SCIATICA: ICD-10-CM

## 2019-01-29 PROCEDURE — 97110 THERAPEUTIC EXERCISES: CPT | Mod: GP | Performed by: PHYSICAL THERAPIST

## 2019-01-29 PROCEDURE — 97140 MANUAL THERAPY 1/> REGIONS: CPT | Mod: GP | Performed by: PHYSICAL THERAPIST

## 2019-01-29 NOTE — LETTER
[facility]  [address]  [facility] [address]  [phone]    2019    Re: Sb Mohan   :   1940  MRN:  [id number]   REFERRING PHYSICIAN:   Jesús Ivey    [facility]  Date of Initial Evaluation:  1/15/19  Visits:  Rxs Used: 4  Reason for Referral:  Acute bilateral low back pain with left-sided sciatica    Oswestry Score: 2 %                 DISCHARGE REPORT  Progress reporting period is from 1-15-19 to 19.       SUBJECTIVE  Subjective changes noted by patient:  Patient reports that he is about 95% of normal. He bowled 2 different times this week and had no pain during or after.  He raked snow from his roof and had no pain.     Current pain level is 0/10  .     Previous pain level was  4/10  .   Changes in function:  Yes (See Goal flowsheet attached for changes in current functional level)  Adverse reaction to treatment or activity: None    OBJECTIVE  Changes noted in objective findings: Standing LROM: FB with fingertips to toes and no pain and no pain with RFIS, BB mod loss and no pain, and B side glides min to mod loss and no pain.  Patient needs occasional reminders to use neutral postures, especially in sitting.         ASSESSMENT/PLAN  Updated problem list and treatment plan: Diagnosis 1:  Lumbar derangement  Pain -  manual therapy, self management, education, directional preference exercise and home program  Decreased ROM/flexibility - manual therapy, therapeutic exercise and home program  Decreased joint mobility - manual therapy, therapeutic exercise and home program  Impaired muscle performance - neuro re-education and home program  Decreased function - therapeutic activities and home program  Impaired posture - neuro re-education and home program  STG/LTGs have been met or progress has been made towards goals:  Yes (See Goal flow sheet completed today.)  Assessment of Progress: The  patient's condition is improving.  The patient has met all of their long term goals.  Self Management Plans:  Patient is independent in a home treatment program.  Patient is independent in self management of symptoms.    Sb continues to require the following intervention to meet STG and LTG's:  PT intervention is no longer required to meet STG/LTG.          Re: Sb Mohan   :   1940    Recommendations:  This patient is ready to be discharged from therapy and continue their home treatment program.    Please refer to the daily flowsheet for treatment today, total treatment time and time spent performing 1:1 timed codes.    Thank you for your referral.    INQUIRIES  Therapist: Lalita Briscoe, PT   [facility]  [address]  [facility] [address]  Phone: [phone]  Fax: [phone]

## 2019-01-29 NOTE — PROGRESS NOTES
Subjective:  HPI  Oswestry Score: 2 %                 Objective:  System    Physical Exam    General     ROS    Assessment/Plan:    DISCHARGE REPORT    Progress reporting period is from 1-15-19 to 1-29-19.       SUBJECTIVE  Subjective changes noted by patient:  Patient reports that he is about 95% of normal. He bowled 2 different times this week and had no pain during or after.  He raked snow from his roof and had no pain.     Current pain level is 0/10  .     Previous pain level was  4/10  .   Changes in function:  Yes (See Goal flowsheet attached for changes in current functional level)  Adverse reaction to treatment or activity: None    OBJECTIVE  Changes noted in objective findings: Standing LROM: FB with fingertips to toes and no pain and no pain with RFIS, BB mod loss and no pain, and B side glides min to mod loss and no pain.  Patient needs occasional reminders to use neutral postures, especially in sitting.         ASSESSMENT/PLAN  Updated problem list and treatment plan: Diagnosis 1:  Lumbar derangement  Pain -  manual therapy, self management, education, directional preference exercise and home program  Decreased ROM/flexibility - manual therapy, therapeutic exercise and home program  Decreased joint mobility - manual therapy, therapeutic exercise and home program  Impaired muscle performance - neuro re-education and home program  Decreased function - therapeutic activities and home program  Impaired posture - neuro re-education and home program  STG/LTGs have been met or progress has been made towards goals:  Yes (See Goal flow sheet completed today.)  Assessment of Progress: The patient's condition is improving.  The patient has met all of their long term goals.  Self Management Plans:  Patient is independent in a home treatment program.  Patient is independent in self management of symptoms.    Sb continues to require the following intervention to meet STG and LTG's:  PT intervention is no longer  required to meet STG/LTG.    Recommendations:  This patient is ready to be discharged from therapy and continue their home treatment program.    Please refer to the daily flowsheet for treatment today, total treatment time and time spent performing 1:1 timed codes.

## 2019-01-30 PROBLEM — M54.41 ACUTE RIGHT-SIDED LOW BACK PAIN WITH RIGHT-SIDED SCIATICA: Status: RESOLVED | Noted: 2017-05-11 | Resolved: 2019-01-30

## 2019-01-30 PROBLEM — M54.42 BILATERAL LOW BACK PAIN WITH LEFT-SIDED SCIATICA: Status: RESOLVED | Noted: 2019-01-15 | Resolved: 2019-01-30

## 2020-09-23 ENCOUNTER — APPOINTMENT (OUTPATIENT)
Dept: URBAN - METROPOLITAN AREA CLINIC 259 | Age: 80
Setting detail: DERMATOLOGY
End: 2020-09-23

## 2020-09-23 DIAGNOSIS — L57.0 ACTINIC KERATOSIS: ICD-10-CM

## 2020-09-23 DIAGNOSIS — L57.8 OTHER SKIN CHANGES DUE TO CHRONIC EXPOSURE TO NONIONIZING RADIATION: ICD-10-CM

## 2020-09-23 DIAGNOSIS — L85.3 XEROSIS CUTIS: ICD-10-CM

## 2020-09-23 DIAGNOSIS — L81.4 OTHER MELANIN HYPERPIGMENTATION: ICD-10-CM

## 2020-09-23 DIAGNOSIS — L82.1 OTHER SEBORRHEIC KERATOSIS: ICD-10-CM

## 2020-09-23 PROCEDURE — 99213 OFFICE O/P EST LOW 20 MIN: CPT | Mod: 25

## 2020-09-23 PROCEDURE — OTHER LIQUID NITROGEN: OTHER

## 2020-09-23 PROCEDURE — OTHER COUNSELING: OTHER

## 2020-09-23 PROCEDURE — 17000 DESTRUCT PREMALG LESION: CPT

## 2020-09-23 ASSESSMENT — LOCATION SIMPLE DESCRIPTION DERM
LOCATION SIMPLE: RIGHT TEMPLE
LOCATION SIMPLE: INFERIOR FOREHEAD
LOCATION SIMPLE: LEFT UPPER BACK

## 2020-09-23 ASSESSMENT — LOCATION DETAILED DESCRIPTION DERM
LOCATION DETAILED: INFERIOR MID FOREHEAD
LOCATION DETAILED: RIGHT MID TEMPLE
LOCATION DETAILED: LEFT SUPERIOR UPPER BACK

## 2020-09-23 ASSESSMENT — LOCATION ZONE DERM
LOCATION ZONE: TRUNK
LOCATION ZONE: FACE

## 2020-09-23 NOTE — PROCEDURE: LIQUID NITROGEN
Detail Level: Detailed
Duration Of Freeze Thaw-Cycle (Seconds): 1
Render Note In Bullet Format When Appropriate: No
Number Of Freeze-Thaw Cycles: 1 freeze-thaw cycle
Post-Care Instructions: I reviewed with the patient in detail post-care instructions. Patient is to wear sunprotection, and avoid picking at any of the treated lesions. Pt may apply Vaseline to crusted or scabbing areas.
Consent: The patient's consent was obtained including but not limited to risks of crusting, scabbing, blistering, scarring, darker or lighter pigmentary change, recurrence, incomplete removal and infection.

## 2021-02-06 ENCOUNTER — HEALTH MAINTENANCE LETTER (OUTPATIENT)
Age: 81
End: 2021-02-06

## 2021-02-08 ENCOUNTER — IMMUNIZATION (OUTPATIENT)
Dept: PEDIATRICS | Facility: CLINIC | Age: 81
End: 2021-02-08
Payer: MEDICARE

## 2021-02-08 PROCEDURE — 0001A PR COVID VAC PFIZER DIL RECON 30 MCG/0.3 ML IM: CPT

## 2021-02-08 PROCEDURE — 91300 PR COVID VAC PFIZER DIL RECON 30 MCG/0.3 ML IM: CPT

## 2021-03-01 ENCOUNTER — IMMUNIZATION (OUTPATIENT)
Dept: PEDIATRICS | Facility: CLINIC | Age: 81
End: 2021-03-01
Attending: INTERNAL MEDICINE
Payer: MEDICARE

## 2021-03-01 PROCEDURE — 91300 PR COVID VAC PFIZER DIL RECON 30 MCG/0.3 ML IM: CPT

## 2021-03-01 PROCEDURE — 0002A PR COVID VAC PFIZER DIL RECON 30 MCG/0.3 ML IM: CPT

## 2021-07-29 ENCOUNTER — APPOINTMENT (OUTPATIENT)
Dept: URBAN - METROPOLITAN AREA CLINIC 257 | Age: 81
Setting detail: DERMATOLOGY
End: 2021-07-29

## 2021-07-29 DIAGNOSIS — D18.0 HEMANGIOMA: ICD-10-CM

## 2021-07-29 DIAGNOSIS — L81.4 OTHER MELANIN HYPERPIGMENTATION: ICD-10-CM

## 2021-07-29 DIAGNOSIS — Z71.89 OTHER SPECIFIED COUNSELING: ICD-10-CM

## 2021-07-29 DIAGNOSIS — L57.8 OTHER SKIN CHANGES DUE TO CHRONIC EXPOSURE TO NONIONIZING RADIATION: ICD-10-CM

## 2021-07-29 DIAGNOSIS — D22 MELANOCYTIC NEVI: ICD-10-CM

## 2021-07-29 DIAGNOSIS — L82.1 OTHER SEBORRHEIC KERATOSIS: ICD-10-CM

## 2021-07-29 DIAGNOSIS — L57.0 ACTINIC KERATOSIS: ICD-10-CM

## 2021-07-29 PROBLEM — D48.5 NEOPLASM OF UNCERTAIN BEHAVIOR OF SKIN: Status: ACTIVE | Noted: 2021-07-29

## 2021-07-29 PROBLEM — D18.01 HEMANGIOMA OF SKIN AND SUBCUTANEOUS TISSUE: Status: ACTIVE | Noted: 2021-07-29

## 2021-07-29 PROBLEM — D22.5 MELANOCYTIC NEVI OF TRUNK: Status: ACTIVE | Noted: 2021-07-29

## 2021-07-29 PROCEDURE — OTHER MIPS QUALITY: OTHER

## 2021-07-29 PROCEDURE — 99213 OFFICE O/P EST LOW 20 MIN: CPT | Mod: 25

## 2021-07-29 PROCEDURE — OTHER LIQUID NITROGEN: OTHER

## 2021-07-29 PROCEDURE — OTHER BIOPSY BY SHAVE METHOD: OTHER

## 2021-07-29 PROCEDURE — OTHER COUNSELING: OTHER

## 2021-07-29 PROCEDURE — 11102 TANGNTL BX SKIN SINGLE LES: CPT

## 2021-07-29 PROCEDURE — 17000 DESTRUCT PREMALG LESION: CPT | Mod: 59

## 2021-07-29 ASSESSMENT — LOCATION SIMPLE DESCRIPTION DERM
LOCATION SIMPLE: UPPER BACK
LOCATION SIMPLE: LEFT OCCIPITAL SCALP
LOCATION SIMPLE: RIGHT LOWER BACK

## 2021-07-29 ASSESSMENT — LOCATION DETAILED DESCRIPTION DERM
LOCATION DETAILED: LEFT SUPERIOR OCCIPITAL SCALP
LOCATION DETAILED: RIGHT SUPERIOR MEDIAL MIDBACK
LOCATION DETAILED: INFERIOR THORACIC SPINE

## 2021-07-29 ASSESSMENT — LOCATION ZONE DERM
LOCATION ZONE: TRUNK
LOCATION ZONE: SCALP

## 2021-07-29 NOTE — PROCEDURE: LIQUID NITROGEN
Number Of Freeze-Thaw Cycles: 1 freeze-thaw cycle
Show Aperture Variable?: Yes
Detail Level: Generalized
Consent: The patient's consent was obtained including but not limited to risks of crusting, scabbing, blistering, scarring, darker or lighter pigmentary change, recurrence, incomplete removal and infection.
Post-Care Instructions: I reviewed with the patient in detail post-care instructions. Patient is to wear sunprotection, and avoid picking at any of the treated lesions. Pt may apply Vaseline to crusted or scabbing areas.
Duration Of Freeze Thaw-Cycle (Seconds): 1
Render Note In Bullet Format When Appropriate: No

## 2021-07-29 NOTE — PROCEDURE: BIOPSY BY SHAVE METHOD
Consent: Written consent was obtained and risks were reviewed including but not limited to scarring, infection, bleeding, scabbing, incomplete removal, nerve damage and allergy to anesthesia.
Biopsy Type: H and E
Electrodesiccation Text: The wound bed was treated with electrodesiccation after the biopsy was performed.
Post-Care Instructions: I reviewed with the patient in detail post-care instructions. Patient is to keep the biopsy site dry overnight, and then apply bacitracin twice daily until healed. Patient may apply hydrogen peroxide soaks to remove any crusting.
Additional Anesthesia Volume In Cc (Will Not Render If 0): 0
Detail Level: Detailed
Silver Nitrate Text: The wound bed was treated with silver nitrate after the biopsy was performed.
Curettage Text: The wound bed was treated with curettage after the biopsy was performed.
Hemostasis: Drysol
Render In Bullet Format When Appropriate: No
Type Of Destruction Used: Curettage
Anesthesia Volume In Cc (Will Not Render If 0): 0.5
Billing Type: Third-Party Bill
Was A Bandage Applied: Yes
Notification Instructions: Patient will be notified of biopsy results. However, patient instructed to call the office if not contacted within 2 weeks.
Depth Of Biopsy: dermis
Dressing: bandage
Wound Care: Petrolatum
Electrodesiccation And Curettage Text: The wound bed was treated with electrodesiccation and curettage after the biopsy was performed.
Anesthesia Type: 1% lidocaine with epinephrine
Cryotherapy Text: The wound bed was treated with cryotherapy after the biopsy was performed.
Information: Selecting Yes will display possible errors in your note based on the variables you have selected. This validation is only offered as a suggestion for you. PLEASE NOTE THAT THE VALIDATION TEXT WILL BE REMOVED WHEN YOU FINALIZE YOUR NOTE. IF YOU WANT TO FAX A PRELIMINARY NOTE YOU WILL NEED TO TOGGLE THIS TO 'NO' IF YOU DO NOT WANT IT IN YOUR FAXED NOTE.
Biopsy Method: Dermablade

## 2021-09-12 ENCOUNTER — HEALTH MAINTENANCE LETTER (OUTPATIENT)
Age: 81
End: 2021-09-12

## 2021-09-13 ENCOUNTER — APPOINTMENT (OUTPATIENT)
Dept: URBAN - METROPOLITAN AREA CLINIC 259 | Age: 81
Setting detail: DERMATOLOGY
End: 2021-09-13

## 2021-09-13 DIAGNOSIS — L57.0 ACTINIC KERATOSIS: ICD-10-CM

## 2021-09-13 DIAGNOSIS — L81.4 OTHER MELANIN HYPERPIGMENTATION: ICD-10-CM

## 2021-09-13 DIAGNOSIS — L57.8 OTHER SKIN CHANGES DUE TO CHRONIC EXPOSURE TO NONIONIZING RADIATION: ICD-10-CM

## 2021-09-13 PROBLEM — C44.319 BASAL CELL CARCINOMA OF SKIN OF OTHER PARTS OF FACE: Status: ACTIVE | Noted: 2021-09-13

## 2021-09-13 PROCEDURE — OTHER MIPS QUALITY: OTHER

## 2021-09-13 PROCEDURE — 99213 OFFICE O/P EST LOW 20 MIN: CPT | Mod: 25

## 2021-09-13 PROCEDURE — OTHER MOHS BY LAYER: OTHER

## 2021-09-13 PROCEDURE — 17000 DESTRUCT PREMALG LESION: CPT

## 2021-09-13 PROCEDURE — 17311 MOHS 1 STAGE H/N/HF/G: CPT

## 2021-09-13 PROCEDURE — 13132 CMPLX RPR F/C/C/M/N/AX/G/H/F: CPT | Mod: 59

## 2021-09-13 PROCEDURE — OTHER LIQUID NITROGEN: OTHER

## 2021-09-13 PROCEDURE — OTHER COUNSELING: OTHER

## 2021-09-13 ASSESSMENT — LOCATION DETAILED DESCRIPTION DERM
LOCATION DETAILED: RIGHT SUPERIOR MEDIAL UPPER BACK
LOCATION DETAILED: NASAL TIP
LOCATION DETAILED: LEFT INFERIOR FOREHEAD

## 2021-09-13 ASSESSMENT — LOCATION ZONE DERM
LOCATION ZONE: NOSE
LOCATION ZONE: FACE
LOCATION ZONE: TRUNK

## 2021-09-13 ASSESSMENT — LOCATION SIMPLE DESCRIPTION DERM
LOCATION SIMPLE: LEFT FOREHEAD
LOCATION SIMPLE: RIGHT UPPER BACK
LOCATION SIMPLE: NOSE

## 2021-09-13 NOTE — PROCEDURE: LIQUID NITROGEN
Show Aperture Variable?: Yes
Detail Level: Detailed
Post-Care Instructions: I reviewed with the patient in detail post-care instructions. Patient is to wear sunprotection, and avoid picking at any of the treated lesions. Pt may apply Vaseline to crusted or scabbing areas.
Number Of Freeze-Thaw Cycles: 1 freeze-thaw cycle
Consent: The patient's consent was obtained including but not limited to risks of crusting, scabbing, blistering, scarring, darker or lighter pigmentary change, recurrence, incomplete removal and infection.
Render Post-Care Instructions In Note?: no
Duration Of Freeze Thaw-Cycle (Seconds): 1

## 2021-09-13 NOTE — PROCEDURE: MOHS BY LAYER
Complex Requirements: Involvement Of Free Margin?: No
Stage 3: Number Of Sections (Blocks) ?: 0
Number Of Stages: 1
Repair Type: Complex Repair
Nostril Rim Text: The closure involved the nostril rim.
Detail Level: Detailed
Hemostasis: Electrocautery
Complex Requirements: Extensive Undermining Performed?: Yes
Post-Care Instructions: I reviewed with the patient in detail post-care instructions. Patient is not to engage in any heavy lifting, exercise, or swimming for the next 14 days. Should the patient develop any fevers, chills, bleeding, severe pain patient will contact the office immediately.
Debridement Text: The wound edges were debrided prior to proceeding with the closure to facilitate wound healing.
Simple / Intermediate / Complex Repair - Final Wound Length In Cm: 2.7
Anesthesia Type: 1% lidocaine with epinephrine
Vermilion Border Text: The closure involved the vermilion border.
Wound Care: Petrolatum
Retention Suture Text: Retention sutures were placed to support the closure and prevent dehiscence.
Stage 1: Depth Of Layer: adipose tissue
Size Of Lesion: 0.8
Stage 1: Number Of Sections (Blocks) ?: 2
Undermining Type: Entire Wound
Consent: The rationale for Mohs was explained to the patient and consent was obtained. The risks, benefits and alternatives to therapy were discussed in detail. Specifically, the risks of infection, scarring, bleeding, prolonged wound healing, incomplete removal, allergy to anesthesia, nerve injury and recurrence were addressed. Prior to the procedure, the treatment site was clearly identified and confirmed by the patient. All components of Universal Protocol/PAUSE Rule completed.
Distance Of Undermining In Cm (Required): 0.5
Epidermal Sutures: mastisol and steri-strips
Body Location Override (Optional - Billing Will Still Be Based On Selected Body Map Location If Applicable): Left Central Forehead
Stage 1: Defect Y-Dimension: 1.1
Stage 1: Defect X-Dimension: 1.2
Estimated Blood Loss (Cc): minimal
Helical Rim Text: The closure involved the helical rim.
Deep Sutures: 5-0 Vicryl

## 2022-01-06 ENCOUNTER — THERAPY VISIT (OUTPATIENT)
Dept: PHYSICAL THERAPY | Facility: CLINIC | Age: 82
End: 2022-01-06
Payer: MEDICARE

## 2022-01-06 DIAGNOSIS — M54.42 BILATERAL LOW BACK PAIN WITH LEFT-SIDED SCIATICA: ICD-10-CM

## 2022-01-06 DIAGNOSIS — M54.6 BILATERAL THORACIC BACK PAIN: ICD-10-CM

## 2022-01-06 PROCEDURE — 97110 THERAPEUTIC EXERCISES: CPT | Mod: GP | Performed by: PHYSICAL THERAPIST

## 2022-01-06 PROCEDURE — 97161 PT EVAL LOW COMPLEX 20 MIN: CPT | Mod: GP | Performed by: PHYSICAL THERAPIST

## 2022-01-06 PROCEDURE — 97112 NEUROMUSCULAR REEDUCATION: CPT | Mod: GP | Performed by: PHYSICAL THERAPIST

## 2022-01-06 NOTE — PROGRESS NOTES
Physical Therapy Initial Evaluation  Subjective:  The history is provided by the patient.   Patient Health History  Sb De La Rosaisen being seen for mid/lower back pain.     Problem began: 11/16/2021.   Problem occurred: insidious   Pain is reported as 4/10 on pain scale.  General health as reported by patient is fair.  Pertinent medical history includes: depression, heart problems, implanted device and overweight.   Red flags:  None as reported by patient.  Medical allergies: other. Other medical allergies details: sulfa, tetracycline.   Surgeries include:  Orthopedic surgery, other and heart surgery. Other surgery history details: ankle fusion, lumbar lami 2 years ago, pacemaker.    Current medications:  Anti-depressants and cardiac medication.    Current occupation is retired.                     Therapist Generated HPI Evaluation  Problem details: Patient reports that he had lower back laminectomy about 2 years ago after an injury from lifting when they were moving. He resumed bowling and playing golf following the surgery. He has been having some mid/lower back pain for the last 3 months or so for no known reason. MD order from 11-16-21. Patient also notes he is having right shoulder pain. .         Type of problem:  Thoracic and lumbar.    This is a new condition.  Condition occurred with:  Insidious onset.  Where condition occurred: for unknown reasons.  Patient reports pain:  Lower thoracic spine, lumbar spine left, lumbar spine right, thoracic spine right and thoracic spine left.  Pain is described as aching and sharp and is intermittent.  Pain radiates to:  Gluteals left and gluteals right (intermittent upper buttocks B). Pain is the same all the time (pain based more on position or activity).  Since onset symptoms are unchanged.  Associated symptoms:  Loss of strength, other, tingling and numbness (some balance issues, B N&T in toes of both feet for years). Symptoms are exacerbated by sitting, twisting  and lifting (sit paul 2 hour with 4/10 pain)  and relieved by other and ice (stretching, using foam roller).      Barriers include:  None as reported by patient.                        Objective:  System         Lumbar/SI Evaluation  ROM:    AROM Lumbar:   Flexion:          Fingertips to ankles and tight HS and slight increase in LBP with reps   Ext:                    Mod to sign loss and no change and    Side Bend:        Left:     Right:   Rotation:           Left:     Right:   Side Glide:        Left:  Min loss and no change    Right:  Min loss and no change          Lumbar Myotomes:  normal            Lumbar DTR's:  normal      Cord Signs:  normal    Lumbar Dermtomes:  not assessed                Neural Tension/Mobility:  Lumbar:  Normal        Lumbar Palpation:    Tenderness present at Left:    Quadratus Lumborum and Erector Spinae  Tenderness present at Right: Quadratus Lumborum and Erector Spinae  Functional Tests:  not assessed          Spinal Segmental Conclusions:     Level: Hypo noted at L1, L2, L3, S1, L5 and L4                                        Hip Evaluation  Hip PROM:  : ache with passive R hip flexion.                                             Suresh Lumbar Evaluation    Posture:  Sitting: fair  Standing: fair  Lordosis: Reduced  Lateral Shift: no  Correction of Posture: better      Test Movements:  FIS: During: no effect  After: no effect    Repeat FIS: During: increases  After: worse    EIS: During: no effect  After: no effect    Repeat EIS: During: no effect  After: no effect    ANAND: During: no effect  After: no effect    Repeat ANAND: During: increases  After: worse                                                     ROS    Assessment/Plan:    Patient is a 81 year old male with thoracic and lumbar complaints.    Patient has the following significant findings with corresponding treatment plan.                Diagnosis 1:  Thoracolumbar derangement  Pain -  US, manual therapy, self  management, education, directional preference exercise and home program  Decreased ROM/flexibility - manual therapy, therapeutic exercise and home program  Decreased joint mobility - manual therapy, therapeutic exercise and home program  Impaired muscle performance - neuro re-education and home program  Decreased function - therapeutic activities and home program  Impaired posture - neuro re-education and home program    Therapy Evaluation Codes:   1) History comprised of:   Personal factors that impact the plan of care:      None.    Comorbidity factors that impact the plan of care are:      None.     Medications impacting care: None.  2) Examination of Body Systems comprised of:   Body structures and functions that impact the plan of care:      Lumbar spine and Thoracic Spine.   Activity limitations that impact the plan of care are:      Bending, Lifting, Sitting and twisting.  3) Clinical presentation characteristics are:   Stable/Uncomplicated.  4) Decision-Making    Low complexity using standardized patient assessment instrument and/or measureable assessment of functional outcome.  Cumulative Therapy Evaluation is: Low complexity.    Previous and current functional limitations:  (See Goal Flow Sheet for this information)    Short term and Long term goals: (See Goal Flow Sheet for this information)     Communication ability:  Patient appears to be able to clearly communicate and understand verbal and written communication and follow directions correctly.  Treatment Explanation - The following has been discussed with the patient:   RX ordered/plan of care  Anticipated outcomes  Possible risks and side effects  This patient would benefit from PT intervention to resume normal activities.   Rehab potential is good.    Frequency:  1 X week, once daily  Duration:  for 8 weeks  Discharge Plan:  Achieve all LTG.  Independent in home treatment program.  Reach maximal therapeutic benefit.    Please refer to the daily  flowsheet for treatment today, total treatment time and time spent performing 1:1 timed codes.

## 2022-01-07 PROBLEM — M54.6 BILATERAL THORACIC BACK PAIN: Status: ACTIVE | Noted: 2022-01-07

## 2022-01-07 PROBLEM — M54.42 BILATERAL LOW BACK PAIN WITH LEFT-SIDED SCIATICA: Status: ACTIVE | Noted: 2022-01-07

## 2022-01-08 NOTE — PROGRESS NOTES
[facility]    OUTPATIENT Physical Therapy ORTHOPEDIC EVALUATION  PLAN OF TREATMENT FOR OUTPATIENT REHABILITATION  (COMPLETE FOR INITIAL CLAIMS ONLY)  Patient's Last Name, First Name, M.I.  YOB: 1940  Sb Mohan    Provider s Name:  [facility]   Medical Record No.  [id number]   Start of Care Date:  01/06/22   Onset Date:   11/16/21   Type:     _X__PT   ___OT Medical Diagnosis:    Encounter Diagnoses   Name Primary?     Bilateral low back pain with left-sided sciatica      Bilateral thoracic back pain         Treatment Diagnosis:  thoracolumbar derangement        Goals:     01/06/22 0500   Body Part   Goals listed below are for mid/lower back   Goal #1   Goal #1 sitting   Previous Functional Level No restrictions   Current Functional Level Hours patient can sit   Performance level 2 with 4/10 pain   STG Target Performance Hours patient will be able to sit   Performance level 2 with 3/10 pain   Rationale to allow rest from standing;for community transportation   Due date 01/27/22   LTG Target Performance Hours patient will be able to sit   Performance Level 2 1/2 with 0-1/10 pain   Rationale to allow rest from standing;for community transportation   Due date 03/03/22       Therapy Frequency:  1x/wk  Predicted Duration of Therapy Intervention:  8 wks    Lalita Briscoe, PT                 I CERTIFY THE NEED FOR THESE SERVICES FURNISHED UNDER        THIS PLAN OF TREATMENT AND WHILE UNDER MY CARE .             Physician Signature               Date    X_____________________________________________________                             Certification Date From:  01/06/22   Certification Date To:  03/28/22    Referring Provider:  Guru Galarza    Initial Assessment        See Epic Evaluation SOC Date: 01/06/22

## 2022-01-13 ENCOUNTER — THERAPY VISIT (OUTPATIENT)
Dept: PHYSICAL THERAPY | Facility: CLINIC | Age: 82
End: 2022-01-13
Payer: MEDICARE

## 2022-01-13 DIAGNOSIS — M54.6 BILATERAL THORACIC BACK PAIN: ICD-10-CM

## 2022-01-13 DIAGNOSIS — M54.42 BILATERAL LOW BACK PAIN WITH LEFT-SIDED SCIATICA: ICD-10-CM

## 2022-01-13 PROCEDURE — 97035 APP MDLTY 1+ULTRASOUND EA 15: CPT | Mod: GP | Performed by: PHYSICAL THERAPIST

## 2022-01-13 PROCEDURE — 97110 THERAPEUTIC EXERCISES: CPT | Mod: GP | Performed by: PHYSICAL THERAPIST

## 2022-01-13 PROCEDURE — 97140 MANUAL THERAPY 1/> REGIONS: CPT | Mod: GP | Performed by: PHYSICAL THERAPIST

## 2022-01-20 ENCOUNTER — THERAPY VISIT (OUTPATIENT)
Dept: PHYSICAL THERAPY | Facility: CLINIC | Age: 82
End: 2022-01-20
Payer: MEDICARE

## 2022-01-20 DIAGNOSIS — M54.6 BILATERAL THORACIC BACK PAIN: ICD-10-CM

## 2022-01-20 DIAGNOSIS — M54.42 BILATERAL LOW BACK PAIN WITH LEFT-SIDED SCIATICA: ICD-10-CM

## 2022-01-20 PROCEDURE — 97140 MANUAL THERAPY 1/> REGIONS: CPT | Mod: GP | Performed by: PHYSICAL THERAPIST

## 2022-01-20 PROCEDURE — 97110 THERAPEUTIC EXERCISES: CPT | Mod: GP | Performed by: PHYSICAL THERAPIST

## 2022-01-20 PROCEDURE — 97035 APP MDLTY 1+ULTRASOUND EA 15: CPT | Mod: GP | Performed by: PHYSICAL THERAPIST

## 2022-01-31 ENCOUNTER — THERAPY VISIT (OUTPATIENT)
Dept: PHYSICAL THERAPY | Facility: CLINIC | Age: 82
End: 2022-01-31
Payer: MEDICARE

## 2022-01-31 DIAGNOSIS — M54.42 BILATERAL LOW BACK PAIN WITH LEFT-SIDED SCIATICA: ICD-10-CM

## 2022-01-31 DIAGNOSIS — M54.6 BILATERAL THORACIC BACK PAIN: ICD-10-CM

## 2022-01-31 PROCEDURE — 97110 THERAPEUTIC EXERCISES: CPT | Mod: GP | Performed by: PHYSICAL THERAPIST

## 2022-01-31 PROCEDURE — 97035 APP MDLTY 1+ULTRASOUND EA 15: CPT | Mod: GP | Performed by: PHYSICAL THERAPIST

## 2022-01-31 PROCEDURE — 97140 MANUAL THERAPY 1/> REGIONS: CPT | Mod: GP | Performed by: PHYSICAL THERAPIST

## 2022-02-07 ENCOUNTER — TRANSCRIBE ORDERS (OUTPATIENT)
Dept: OTHER | Age: 82
End: 2022-02-07

## 2022-02-07 DIAGNOSIS — R20.2 PARESTHESIA OF BILATERAL LEGS: Primary | ICD-10-CM

## 2022-02-07 DIAGNOSIS — G20.C PARKINSONISM (H): ICD-10-CM

## 2022-02-07 DIAGNOSIS — R26.9 ABNORMALITY OF GAIT: ICD-10-CM

## 2022-02-07 DIAGNOSIS — G25.81 RESTLESS LEG SYNDROME: ICD-10-CM

## 2022-02-10 ENCOUNTER — THERAPY VISIT (OUTPATIENT)
Dept: PHYSICAL THERAPY | Facility: CLINIC | Age: 82
End: 2022-02-10
Payer: MEDICARE

## 2022-02-10 DIAGNOSIS — M54.42 BILATERAL LOW BACK PAIN WITH LEFT-SIDED SCIATICA: ICD-10-CM

## 2022-02-10 DIAGNOSIS — M54.6 BILATERAL THORACIC BACK PAIN: ICD-10-CM

## 2022-02-10 PROCEDURE — 97140 MANUAL THERAPY 1/> REGIONS: CPT | Mod: GP | Performed by: PHYSICAL THERAPIST

## 2022-02-10 PROCEDURE — 97110 THERAPEUTIC EXERCISES: CPT | Mod: GP | Performed by: PHYSICAL THERAPIST

## 2022-02-10 PROCEDURE — 97035 APP MDLTY 1+ULTRASOUND EA 15: CPT | Mod: GP | Performed by: PHYSICAL THERAPIST

## 2022-02-27 ENCOUNTER — HEALTH MAINTENANCE LETTER (OUTPATIENT)
Age: 82
End: 2022-02-27

## 2022-03-03 ENCOUNTER — THERAPY VISIT (OUTPATIENT)
Dept: PHYSICAL THERAPY | Facility: CLINIC | Age: 82
End: 2022-03-03
Payer: MEDICARE

## 2022-03-03 DIAGNOSIS — M54.6 BILATERAL THORACIC BACK PAIN: ICD-10-CM

## 2022-03-03 DIAGNOSIS — M54.42 BILATERAL LOW BACK PAIN WITH LEFT-SIDED SCIATICA: ICD-10-CM

## 2022-03-03 PROCEDURE — 97140 MANUAL THERAPY 1/> REGIONS: CPT | Mod: GP | Performed by: PHYSICAL THERAPIST

## 2022-03-03 PROCEDURE — 97035 APP MDLTY 1+ULTRASOUND EA 15: CPT | Mod: GP | Performed by: PHYSICAL THERAPIST

## 2022-03-03 PROCEDURE — 97110 THERAPEUTIC EXERCISES: CPT | Mod: GP | Performed by: PHYSICAL THERAPIST

## 2022-03-10 ENCOUNTER — HOSPITAL ENCOUNTER (OUTPATIENT)
Dept: PHYSICAL THERAPY | Facility: CLINIC | Age: 82
Setting detail: THERAPIES SERIES
Discharge: HOME | End: 2022-03-10
Attending: PSYCHIATRY & NEUROLOGY
Payer: MEDICARE

## 2022-03-10 DIAGNOSIS — R26.9 ABNORMALITY OF GAIT: ICD-10-CM

## 2022-03-10 DIAGNOSIS — G25.81 RESTLESS LEG SYNDROME: ICD-10-CM

## 2022-03-10 DIAGNOSIS — R20.2 PARESTHESIA OF BILATERAL LEGS: ICD-10-CM

## 2022-03-10 DIAGNOSIS — G20.C PARKINSONISM (H): ICD-10-CM

## 2022-03-10 PROCEDURE — 97161 PT EVAL LOW COMPLEX 20 MIN: CPT | Mod: GP | Performed by: PHYSICAL THERAPIST

## 2022-03-10 PROCEDURE — 97112 NEUROMUSCULAR REEDUCATION: CPT | Mod: GP | Performed by: PHYSICAL THERAPIST

## 2022-03-11 NOTE — PROGRESS NOTES
[facility]                                                                                   OUTPATIENT PHYSICAL THERAPY FUNCTIONAL EVALUATION  PLAN OF TREATMENT FOR OUTPATIENT REHABILITATION  (COMPLETE FOR INITIAL CLAIMS ONLY)  Patient's Last Name, First Name, M.I.  YOB: 1940  MarquesSb     Provider's Name   [facility]   Medical Record No.  [id number]     Start of Care Date:  03/10/22   Onset Date:   Order date 2/7/2022   Type:     _X__PT   ____OT  ____SLP Medical Diagnosis:   paraesthesia of bilateral legs, Parkinsonism, abnormal gait, restless leg syndrome     PT Diagnosis:  hx of falls Visits from SOC:  1                              __________________________________________________________________________________  Plan of Treatment/Functional Goals:     GOALS  HEP  he will be IND with a HEP of balance exs (2-3 exs to do daily) to prevent falls and improve high level gait.  06/10/22       Therapy Frequency:  other (see comments) (see for pprox 3 visits, every 2-4 weeks)   Predicted Duration of Therapy Intervention:  3 months    Herlinda Yates, PT                                    I CERTIFY THE NEED FOR THESE SERVICES FURNISHED UNDER        THIS PLAN OF TREATMENT AND WHILE UNDER MY CARE .             Physician Signature               Date    X_____________________________________________________                      Certification Date From:    3/10/2022  Certification Date To:   6/7/2022    Referring Provider:  Dr Lindsey    Initial Assessment  See Epic Evaluation- Start of Care Date: 03/10/22

## 2022-03-11 NOTE — PROGRESS NOTES
03/10/22 1000   General Information   Start of Care Date 03/10/22   Referring Physician Dr Lindsey   Orders Evaluate and Treat as Indicated   Additional Orders BIG and LOUD therapy   Order Date 02/07/22   Medical Diagnosis parasthesia of both legs, parkinsonism, abnormal gait, restless leg syndrome   Special Instructions I have a need to stay hydrated, I get really weak. I have that reaction. Noticed it last year when golfing and walking a lot.    Surgical/Medical history reviewed Yes  (anxiety, insomnia, afib/ICD, chronic LBP)   Pertinent history of current problem (include personal factors and/or comorbidities that impact the POC) feet hot/cold has gotten worse in the past 2 months. It bothers me, wakes me up. Started gabpentine at night only 200 mg and that has taken care of the problem. Take it 9pm and doesnt recover until 12 the next day. groggy/brain fog. He does not have tremors. Balance. He describes an episode of stumbling forward at golf one time. 3 years have fallen 3 times, mostly on ice.    Pertinent Visual History  has retina issue, gets avastin injections, readers.   Prior level of function comment walks 1-2 miles a day with wife, 4-5 days a week. walk at [employer] [facility] [employer] or outside. PT routine of back exs (back surgery 2 yrs ago). Bowl weekly in league. Golfs also in a league. 2-3 times a week for 18 holes golf April-October, Bowling is Sept to may   Previous/Current Treatment Physical Therapy;Other   Improvement after PT Moderate  (chronic LBP)   Current Community Support Family/friend caregiver  (wife)   Patient role/Employment history Retired   Fall Risk Screen   Fall screen completed by PT   Have you fallen 2 or more times in the past year? No   Have you fallen and had an injury in the past year? No   Timed Up and Go score (seconds) 9.09  (repeat 8.87 seconds )   Is patient a fall risk? No   Abuse Screen (yes response referral indicated)   Feels Unsafe at Home or Work/School no    Feels Threatened by Someone no   Does Anyone Try to Keep You From Having Contact with Others or Doing Things Outside Your Home? no   Physical Signs of Abuse Present no   Pain   Patient currently in pain Yes   Pain location left hip and lower back (post bowling last night).    Pain comments feet are cold right now   Posture   Posture Normal   Range of Motion (ROM)   ROM Comment CROM WFLS inclusing retraction/protraction, U/Es WNLs.    Gait   Gait Comments 25fTW at fast speed 5.78 seconds and 11 steps   Gait Special Tests 25 Foot Timed Walk   Seconds 8.66   Steps 14 Steps   Comments decreased arm swing both sides, right is worse   Gait Special Tests Dynamic Gait Index   Score out of 24 24   Gait Special Tests Functional Gait Assessment Score out of 30   Score out of 30 24   Comments backward walking 20 feet (6 meters) 16.65 seconds and 23 steps.    Balance Special Tests Timed Up and Go   Seconds 9.09 Seconds  (turning to left, 8.87 turning to right)   Comments TUG cognitive (days of week backwards) 9.63 seconds, TUG cogntive (months of year backwards) 11 seconds   Balance Special Tests Sit to Stand Reps in 30 Seconds   Reps in 30 seconds 8   Height 18   Comments good control, no change in back pain   Sensory Examination   Sensory Perception other (describe)   Sensory Perception Comments sensation of feet: cold, can feel N/T   Clinical Impression   Criteria for Skilled Therapeutic Interventions Met yes, treatment indicated   PT Diagnosis hx of falls   Influenced by the following impairments chronic LBP, high level gait (backwards, tandem, and eyes closed).    Functional limitations due to impairments affects community mobility and recreational activity (bowling, golf, walkiing for ex)   Clinical Presentation Stable/Uncomplicated   Clinical Presentation Rationale medical history, impairments, FGA, Sit to stand test, TUG and clincial jdgdement   Clinical Decision Making (Complexity) Low complexity   Therapy Frequency  other (see comments)  (see for pprox 3 visits, every 2-4 weeks)   Predicted Duration of Therapy Intervention (days/wks) 3 months   Risk & Benefits of therapy have been explained Yes   Patient, Family & other staff in agreement with plan of care Yes   Clinical Impression Comments kimmy has had 3 falls in [facility] past 3 years. He thinks that they were related to slipping on ice. He is very active and he did well on the tests today. The only problem that I see is decreased arm swing with gait and trouble coordinating arm/legs for high level gait/balance exs. He should do well with a HEP to tune up his balance reactions.    Goal 1   Goal Identifier HEP   Goal Description he will be IND with a HEP of balance exs (2-3 exs to do daily) to prevent falls and improve high level gait.   Target Date 06/10/22   Total Evaluation Time   PT Eval, Low Complexity Minutes (32402) 45   Diann Yates DPT, MPT, NCS  Physical Therapist   Board Certified Neurologic Clinical Specialist     [facility]   [address] [place] [address]   [email or web address]  [email or web address]   [phone]   Clinic: [phone] //   Fax: [phone]

## 2022-03-26 ENCOUNTER — HOSPITAL ENCOUNTER (OUTPATIENT)
Dept: PHYSICAL THERAPY | Facility: CLINIC | Age: 82
Setting detail: THERAPIES SERIES
Discharge: HOME | End: 2022-03-26
Attending: PSYCHIATRY & NEUROLOGY
Payer: MEDICARE

## 2022-03-26 PROCEDURE — 97116 GAIT TRAINING THERAPY: CPT | Mod: GP | Performed by: PHYSICAL THERAPIST

## 2022-03-26 PROCEDURE — 97112 NEUROMUSCULAR REEDUCATION: CPT | Mod: GP | Performed by: PHYSICAL THERAPIST

## 2022-04-07 ENCOUNTER — THERAPY VISIT (OUTPATIENT)
Dept: PHYSICAL THERAPY | Facility: CLINIC | Age: 82
End: 2022-04-07
Payer: MEDICARE

## 2022-04-07 DIAGNOSIS — M54.42 BILATERAL LOW BACK PAIN WITH LEFT-SIDED SCIATICA: ICD-10-CM

## 2022-04-07 DIAGNOSIS — M54.6 BILATERAL THORACIC BACK PAIN: ICD-10-CM

## 2022-04-07 PROCEDURE — 97110 THERAPEUTIC EXERCISES: CPT | Mod: GP | Performed by: PHYSICAL THERAPIST

## 2022-04-07 PROCEDURE — 97035 APP MDLTY 1+ULTRASOUND EA 15: CPT | Mod: GP | Performed by: PHYSICAL THERAPIST

## 2022-04-07 PROCEDURE — 97140 MANUAL THERAPY 1/> REGIONS: CPT | Mod: GP | Performed by: PHYSICAL THERAPIST

## 2022-04-07 NOTE — PROGRESS NOTES
Subjective:  HPI  Physical Exam  Oswestry Score: 6 %                 Objective:  System    Physical Exam    General     ROS    Assessment/Plan:    DISCHARGE REPORT    Progress reporting period is from 1-6-22 to 4-7-22.       SUBJECTIVE  Subjective changes noted by patient:   Patient reports that he has had some on and off pain over the last month and is usually it is if he sits for a long time. He bowled last night for the last time and had no pain and no pain this morning which is unusual. He feels he is about 95% of normal.      Current Pain level: 0/10.     Previous pain level was  4/10 Initial Pain level: 1/10.   Changes in function:  Yes (See Goal flowsheet attached for changes in current functional level)  Adverse reaction to treatment or activity: None    OBJECTIVE  Changes noted in objective findings:   Standing LROM:FB with fingertips to ankles and no pain and no pain with RFIS and BB  mod loss and no pain and no pain with GIRMA. Hypomobility remains with PAs to lumbar spine.       ASSESSMENT/PLAN  Updated problem list and treatment plan: Diagnosis 1:  thoracolumbar derangement   STG/LTGs have been met and progress has been made towards goals:  Yes (See Goal flow sheet completed today.)  Assessment of Progress: The patient's condition is improving.  The patient's condition has potential to improve.  Patient is meeting short term goals and is progressing towards long term goals.  Self Management Plans:  Patient is independent in a home treatment program.  Patient is independent in self management of symptoms.    Sb continues to require the following intervention to meet STG and LTG's:  PT intervention is no longer required to meet STG/LTG.    Recommendations:  This patient is ready to be discharged from therapy and continue their home treatment program.    Please refer to the daily flowsheet for treatment today, total treatment time and time spent performing 1:1 timed codes.

## 2022-04-07 NOTE — LETTER
[facility]  [address]  [facility] [address]  [phone]    2022    Re: Sb Mohan   :   1940  MRN:  [id number]   REFERRING PHYSICIAN:   MD [facility]  Date of Initial Evaluation: 2022  Visits:  Rxs Used: 7  Reason for Referral:     Bilateral low back pain with left-sided sciatica  Bilateral thoracic back pain    DISCHARGE REPORT  Progress reporting period is from 22 to 22.       SUBJECTIVE  Subjective changes noted by patient:   Patient reports that he has had some on and off pain over the last month and is usually it is if he sits for a long time. He bowled last night for the last time and had no pain and no pain this morning which is unusual. He feels he is about 95% of normal.      Current Pain level: 0/10.     Previous pain level was  4/10 Initial Pain level: 1/10.   Changes in function:  Yes (See Goal flowsheet attached for changes in current functional level)  Adverse reaction to treatment or activity: None    OBJECTIVE  Changes noted in objective findings:   Standing LROM:FB with fingertips to ankles and no pain and no pain with RFIS and BB  mod loss and no pain and no pain with GIRMA. Hypomobility remains with PAs to lumbar spine.       ASSESSMENT/PLAN  Updated problem list and treatment plan: Diagnosis 1:  thoracolumbar derangement   STG/LTGs have been met and progress has been made towards goals:  Yes (See Goal flow sheet completed today.)  Assessment of Progress: The patient's condition is improving.  The patient's condition has potential to improve.  Patient is meeting short term goals and is progressing towards long term goals.  Self Management Plans:  Patient is independent in a home treatment program.  Patient is independent in self management of symptoms.    Sb continues to require the following intervention to meet STG and LTG's:  PT  intervention is no longer required to meet STG/LTG.          Re: Sb Mohan   :   1940    Recommendations:  This patient is ready to be discharged from therapy and continue their home treatment program.    Please refer to the daily flowsheet for treatment today, total treatment time and time spent performing 1:1 timed codes.    Thank you for your referral.    INQUIRIES  Therapist: Lalita Briscoe. PT   [facility]  [address]  [facility] [address]  Phone: [phone]  Fax: [phone]

## 2022-04-26 ENCOUNTER — TRANSCRIBE ORDERS (OUTPATIENT)
Dept: OTHER | Age: 82
End: 2022-04-26

## 2022-04-26 DIAGNOSIS — G20.C PARKINSONISM (H): ICD-10-CM

## 2022-04-26 DIAGNOSIS — R47.89 OTHER SPEECH DISTURBANCES: Primary | ICD-10-CM

## 2022-05-20 ENCOUNTER — HOSPITAL ENCOUNTER (OUTPATIENT)
Dept: SPEECH THERAPY | Facility: CLINIC | Age: 82
Setting detail: THERAPIES SERIES
Discharge: HOME | End: 2022-05-20
Attending: PSYCHIATRY & NEUROLOGY
Payer: MEDICARE

## 2022-05-20 PROCEDURE — 92524 BEHAVRAL QUALIT ANALYS VOICE: CPT | Mod: GN | Performed by: STUDENT IN AN ORGANIZED HEALTH CARE EDUCATION/TRAINING PROGRAM

## 2022-05-27 NOTE — PROGRESS NOTES
"     Speech-Language Pathology Department   VOICE AND SPEECH EVALUATION ([facility] LOUD)  [facility] [place]    05/20/22 7377   General Information   Type Of Visit Initial   Start Of Care Date 05/20/22   Referring Physician Juan Lindsey MD  (Neurology)   Orders Evaluate And Treat   Orders Comment \"[facility]\" speech therapy in [facility] program   Medical Diagnosis Parkinsonism   Onset Of Illness/injury Or Date Of Surgery 04/26/22   Precautions/Limitations  no known precautions/limitations   Hearing WFL for 1:1 conversation in session   Surgical/Medical history reviewed Yes   Pertinent History Of Current Problem 80yo male with parkinsonism symptoms but no official diagnosis per pt report, presenting for voice and speech evaluation.  Pt reports that the volume of his speech is softer, which gets worse when he is experiencing reflux symptoms.  His voice will also sound raspy when he is experiencing reflux symptoms and/or after getting up from lying down.  He frequently has to clear his throat.  He reports he is taking lexapro and gabapentin that are causing brain fog into the early afternoons, and that his voice typically sounds better later on in the afternoon when he is feeling more alert.  His wife Patricia feels that his voice is softer and that he mumbles sometimes.  Pt reports that he has to chew his food carefully following extraction of some of his teeth, but he denies other dysphagia symptoms.  He also denies dyspnea.  Please see chart for additional PMH.   Prior Level of Functioning No previous problems.   Patient Role/employment History Retired   General Observations Pt was accompanied by his wife, Patricia.  He reports that today is a typical voice day for his symptoms.   Patient/family Goals To figure out what's causing his symptoms   Fall Risk Screen   Fall screen completed by PT   Fall screen comments See PT enrrique from 3/10/22.   Evaluation Results   Voice Observations COUGH/THROAT " "CLEAR: intermittent hard throat clears observed throughout the evaluation.   Voice Profile during conversation, 1 min monologue and paragraph reading   Voice Quality Breathy;Raspy;Wet / gurgly   Voice quality comments SPEECH: Consistent moderate breathiness with frequent intermittent mild-moderate roughness and occasional secretion interference.  SINGING: Not assessed, pt is not a sellers.  THERAPY PROBES: improved voice quality with increased loudness, diaphragmatic engagement, and glottal closure probes.   Voice quality severity rating continuum (1=Severe, 7=WNL) 4  (CAPE-V Overall Severity: 42/100)   Breath control Weak   Breath Control comments Suspect poor laryngeal valving leading to breathy voice quality, with poor respiratory/phonatory coordination.   Breath control severity rating continuum (1=Severe, 7=WNL) 5   Voice Use / Effort Throat push   Voice Use / Effort comments Pt rates his current phonatory effort for speech as 2-3/10 (10 is maximum effort), noting that his voice feels strained today.   Voice use / Effort severity rating continuum (1=Severe, 7=WNL) 6   Fundamental frequency (Hz)   (Centered around Ab2)   Pitch /Frequency Description Monotone  (Mild)   Pitch / Frequency severity rating continuum (1=Severe, 7=WNL) 6   Average dB 66 dB   Volume Too quiet   Volume comments Volume for conversational speech is mildly reduced but still adequate for the setting (1:1 conversation in session).  A whisper is normal.  Soft phonation is breathy.  Loud phonation is clearer in quality, with good volume increase.   Volume severity rating continuum (1=Severe, 7=WNL) 6   Neuromuscular Control Other    Neuromuscular Control severity rating continuum (1=Severe, 7=WNL) 6   Neuromuscular comments Speech is 100% intelligible, with occasional \"trip ups\" but no overtly imprecise articulation.  Labial/lingual AMRs and SMRs are WFL.   Resonance WNL   Resonance severity rating continuum (1=Severe, 7=WNL) 7   Comments " "Moderate dysphonia and hypophonia characterized by breathiness, roughness, secretion interference, reduced volume, poor respiratory/phonatory coordination, monotone intonation, occasional \"trip ups\" in articulation, and increased phonatory effort for speech.   Adduction /Abduction Function   Laryngeal diadokinetic speed   (Fast)   Laryngeal diadokinetic strength   (Strained)   Laryngeal diadokinetic consistency Regular   Adduction / Abduction function scale Age 66+, norm per sec:  4   Vibratory Function of Vocal Folds   Prolonged 'ah' at mid pitch (sec) 7.8 seconds at G2 with consistent breathiness   Prolonged 'ah' at high pitch (sec) 5 seconds at C#4 with breathiness and a pitch break   Vibratory Function of Vocal Folds Scale Males 20 - 80 yrs: 15 - 25 secs   Vibratory Function of Vocal Folds Comments Reduced in duration, volume, and quality   Compression Strength of Vocal Folds / Larynx Muscles   Vowels average volume dB 69 dB at G2; 68 dB at C#4   Reading aloud average volume dB 68 dB   Conversation average volume dB 66 dB   Dynamic volume range minimum dB 66 dB   Dynamic volume range maximum dB 82 dB   Efficiency of increased volume Relaxed throat   Ease / Effort of loud/quiet volumes Quiet volume easier   Function of Lengtheners / Shorteners (CT and TA Muscles)   Pitch glides Lower pitches more dysphonic  (Lowest: Eb3 on glissade, G2 on sustained pitch; Highest: Ab4)   Videostroboscopy / Endoscopy   Other observations Has not been completed.   General Therapy Interventions   Intervention Comments Pt choosing not to pursue therapy at this time, no goals.   Impressions and Recommendations   Communication Diagnosis Moderate dysphonia and hypophonia   Summary Mr. Mohan presents with moderate dysphonia and hypophonia characterized by breathiness, roughness, secretion interference, reduced volume, poor respiratory/phonatory coordination, monotone intonation, occasional \"trip ups\" in articulation, and increased " phonatory effort for speech.  His voice and speech symptoms are characteristic of, but not diagnostic of, parkinsonism vs presbylarynx, likely exacerbated by laryngeal irritation from GERD.   Recommendations A course of skilled speech therapy is recommended, but pt chooses not to pursue therapy at this time, as he would like to receive his underlying diagnosis first.  ENT referral may be beneficial for differential diagnosis of his symptoms, in addition to ongoing Neurology consults.   Risks and Benefits of Treatment have been explained. Yes   Patient & /or Caregiver  in agreement with plan of care Yes   Patient Education SLP provided education regarding evaluation findings, potential causes of voice symptoms, and proposed POC.  Pt verbalized understanding.  Pt electing not to pursue therapy at this time, as his priority is receiving a diagnosis.  No further intervention is planned at this time.   Educational Assessment   Barriers to Learning No barriers   Preferred Learning Style Listening;Reading;Demonstration;Pictures / Video   Total Session Time   Voice Minutes (00355) 35   Total Evaluation Time 35     Thank you for the referral of this patient.    Allison Alpers Ackmann, B.A. (james), M.A., CCC-SLP  Speech-Language Pathologist  [facility] Certificate of Vocology  [facility]  [phone]

## 2022-11-19 ENCOUNTER — HEALTH MAINTENANCE LETTER (OUTPATIENT)
Age: 82
End: 2022-11-19

## 2024-01-27 ENCOUNTER — HEALTH MAINTENANCE LETTER (OUTPATIENT)
Age: 84
End: 2024-01-27

## 2024-08-26 ENCOUNTER — LAB REQUISITION (OUTPATIENT)
Dept: LAB | Facility: CLINIC | Age: 84
End: 2024-08-26
Payer: MEDICARE

## 2024-08-26 DIAGNOSIS — Z13.220 ENCOUNTER FOR SCREENING FOR LIPOID DISORDERS: ICD-10-CM

## 2024-08-26 DIAGNOSIS — Z13.29 ENCOUNTER FOR SCREENING FOR OTHER SUSPECTED ENDOCRINE DISORDER: ICD-10-CM

## 2024-08-26 DIAGNOSIS — Z13.1 ENCOUNTER FOR SCREENING FOR DIABETES MELLITUS: ICD-10-CM

## 2024-08-26 DIAGNOSIS — Z13.0 ENCOUNTER FOR SCREENING FOR DISEASES OF THE BLOOD AND BLOOD-FORMING ORGANS AND CERTAIN DISORDERS INVOLVING THE IMMUNE MECHANISM: ICD-10-CM

## 2024-08-27 LAB
ANION GAP SERPL CALCULATED.3IONS-SCNC: 12 MMOL/L (ref 7–15)
BUN SERPL-MCNC: 19.1 MG/DL (ref 8–23)
CALCIUM SERPL-MCNC: 8.7 MG/DL (ref 8.8–10.4)
CHLORIDE SERPL-SCNC: 102 MMOL/L (ref 98–107)
CHOLEST SERPL-MCNC: 107 MG/DL
CREAT SERPL-MCNC: 1.01 MG/DL (ref 0.67–1.17)
EGFRCR SERPLBLD CKD-EPI 2021: 75 ML/MIN/1.73M2
ERYTHROCYTE [DISTWIDTH] IN BLOOD BY AUTOMATED COUNT: 13 % (ref 10–15)
FASTING STATUS PATIENT QL REPORTED: ABNORMAL
GLUCOSE SERPL-MCNC: 144 MG/DL (ref 70–99)
HBA1C MFR BLD: 6.1 %
HCO3 SERPL-SCNC: 23 MMOL/L (ref 22–29)
HCT VFR BLD AUTO: 33.9 % (ref 40–53)
HDLC SERPL-MCNC: 32 MG/DL
HGB BLD-MCNC: 11.3 G/DL (ref 13.3–17.7)
LDLC SERPL CALC-MCNC: 55 MG/DL
MCH RBC QN AUTO: 34.3 PG (ref 26.5–33)
MCHC RBC AUTO-ENTMCNC: 33.3 G/DL (ref 31.5–36.5)
MCV RBC AUTO: 103 FL (ref 78–100)
NONHDLC SERPL-MCNC: 75 MG/DL
NT-PROBNP SERPL-MCNC: 393 PG/ML (ref 0–1800)
PLATELET # BLD AUTO: 150 10E3/UL (ref 150–450)
POTASSIUM SERPL-SCNC: 3.9 MMOL/L (ref 3.4–5.3)
RBC # BLD AUTO: 3.29 10E6/UL (ref 4.4–5.9)
SODIUM SERPL-SCNC: 137 MMOL/L (ref 135–145)
TRIGL SERPL-MCNC: 100 MG/DL
TSH SERPL DL<=0.005 MIU/L-ACNC: 1.08 UIU/ML (ref 0.3–4.2)
WBC # BLD AUTO: 3.9 10E3/UL (ref 4–11)

## 2024-08-27 PROCEDURE — 80061 LIPID PANEL: CPT | Mod: ORL | Performed by: FAMILY MEDICINE

## 2024-08-27 PROCEDURE — 84443 ASSAY THYROID STIM HORMONE: CPT | Mod: ORL | Performed by: FAMILY MEDICINE

## 2024-08-27 PROCEDURE — 36415 COLL VENOUS BLD VENIPUNCTURE: CPT | Mod: ORL | Performed by: FAMILY MEDICINE

## 2024-08-27 PROCEDURE — 85027 COMPLETE CBC AUTOMATED: CPT | Mod: ORL | Performed by: FAMILY MEDICINE

## 2024-08-27 PROCEDURE — 83036 HEMOGLOBIN GLYCOSYLATED A1C: CPT | Mod: ORL | Performed by: FAMILY MEDICINE

## 2024-08-27 PROCEDURE — 83880 ASSAY OF NATRIURETIC PEPTIDE: CPT | Mod: ORL | Performed by: FAMILY MEDICINE

## 2024-08-27 PROCEDURE — 80048 BASIC METABOLIC PNL TOTAL CA: CPT | Mod: ORL | Performed by: FAMILY MEDICINE

## 2024-08-27 PROCEDURE — P9604 ONE-WAY ALLOW PRORATED TRIP: HCPCS | Mod: ORL | Performed by: FAMILY MEDICINE

## 2025-02-01 ENCOUNTER — HEALTH MAINTENANCE LETTER (OUTPATIENT)
Age: 85
End: 2025-02-01